# Patient Record
Sex: FEMALE | Race: WHITE | NOT HISPANIC OR LATINO | Employment: FULL TIME
[De-identification: names, ages, dates, MRNs, and addresses within clinical notes are randomized per-mention and may not be internally consistent; named-entity substitution may affect disease eponyms.]

---

## 2017-10-02 ENCOUNTER — RECORDS - HEALTHEAST (OUTPATIENT)
Dept: ADMINISTRATIVE | Facility: OTHER | Age: 32
End: 2017-10-02

## 2017-12-12 ENCOUNTER — RECORDS - HEALTHEAST (OUTPATIENT)
Dept: ADMINISTRATIVE | Facility: OTHER | Age: 32
End: 2017-12-12

## 2018-04-20 ENCOUNTER — RECORDS - HEALTHEAST (OUTPATIENT)
Dept: ADMINISTRATIVE | Facility: OTHER | Age: 33
End: 2018-04-20

## 2018-04-27 ENCOUNTER — RECORDS - HEALTHEAST (OUTPATIENT)
Dept: ADMINISTRATIVE | Facility: OTHER | Age: 33
End: 2018-04-27

## 2020-01-15 ENCOUNTER — OFFICE VISIT - HEALTHEAST (OUTPATIENT)
Dept: FAMILY MEDICINE | Facility: CLINIC | Age: 35
End: 2020-01-15

## 2020-01-15 DIAGNOSIS — N83.201 RIGHT OVARIAN CYST: ICD-10-CM

## 2020-01-15 DIAGNOSIS — R10.30 LOWER ABDOMINAL PAIN: ICD-10-CM

## 2020-01-15 LAB
ALBUMIN SERPL-MCNC: 4.2 G/DL (ref 3.5–5)
ALP SERPL-CCNC: 48 U/L (ref 45–120)
ALT SERPL W P-5'-P-CCNC: 12 U/L (ref 0–45)
ANION GAP SERPL CALCULATED.3IONS-SCNC: 8 MMOL/L (ref 5–18)
AST SERPL W P-5'-P-CCNC: 15 U/L (ref 0–40)
BILIRUB SERPL-MCNC: 0.3 MG/DL (ref 0–1)
BUN SERPL-MCNC: 14 MG/DL (ref 8–22)
CALCIUM SERPL-MCNC: 9.6 MG/DL (ref 8.5–10.5)
CHLORIDE BLD-SCNC: 104 MMOL/L (ref 98–107)
CO2 SERPL-SCNC: 27 MMOL/L (ref 22–31)
CREAT SERPL-MCNC: 0.66 MG/DL (ref 0.6–1.1)
GFR SERPL CREATININE-BSD FRML MDRD: >60 ML/MIN/1.73M2
GLUCOSE BLD-MCNC: 96 MG/DL (ref 70–125)
HCG UR QL: NEGATIVE
LIPASE SERPL-CCNC: 14 U/L (ref 0–52)
POTASSIUM BLD-SCNC: 4.4 MMOL/L (ref 3.5–5)
PROT SERPL-MCNC: 7.5 G/DL (ref 6–8)
SODIUM SERPL-SCNC: 139 MMOL/L (ref 136–145)

## 2020-08-21 ENCOUNTER — AMBULATORY - HEALTHEAST (OUTPATIENT)
Dept: LAB | Facility: CLINIC | Age: 35
End: 2020-08-21

## 2020-08-21 ENCOUNTER — COMMUNICATION - HEALTHEAST (OUTPATIENT)
Dept: TELEHEALTH | Facility: CLINIC | Age: 35
End: 2020-08-21

## 2020-08-21 ENCOUNTER — OFFICE VISIT - HEALTHEAST (OUTPATIENT)
Dept: FAMILY MEDICINE | Facility: CLINIC | Age: 35
End: 2020-08-21

## 2020-08-21 ENCOUNTER — COMMUNICATION - HEALTHEAST (OUTPATIENT)
Dept: FAMILY MEDICINE | Facility: CLINIC | Age: 35
End: 2020-08-21

## 2020-08-21 DIAGNOSIS — M54.50 BILATERAL LOW BACK PAIN WITHOUT SCIATICA, UNSPECIFIED CHRONICITY: ICD-10-CM

## 2020-08-21 DIAGNOSIS — D50.8 OTHER IRON DEFICIENCY ANEMIA: ICD-10-CM

## 2020-08-21 DIAGNOSIS — Z00.00 WELL FEMALE EXAM WITHOUT GYNECOLOGICAL EXAM: ICD-10-CM

## 2020-08-21 LAB
ERYTHROCYTE [DISTWIDTH] IN BLOOD BY AUTOMATED COUNT: 11.1 % (ref 11–14.5)
HCT VFR BLD AUTO: 34.5 % (ref 35–47)
HGB BLD-MCNC: 11.8 G/DL (ref 12–16)
MCH RBC QN AUTO: 30 PG (ref 27–34)
MCHC RBC AUTO-ENTMCNC: 34.1 G/DL (ref 32–36)
MCV RBC AUTO: 88 FL (ref 80–100)
PLATELET # BLD AUTO: 150 THOU/UL (ref 140–440)
PMV BLD AUTO: 10.4 FL (ref 7–10)
RBC # BLD AUTO: 3.92 MILL/UL (ref 3.8–5.4)
WBC: 7.3 THOU/UL (ref 4–11)

## 2020-08-21 RX ORDER — FERROUS SULFATE 325(65) MG
1 TABLET ORAL
Qty: 30 TABLET | Refills: 3 | Status: SHIPPED | OUTPATIENT
Start: 2020-08-21 | End: 2021-08-11

## 2020-08-21 ASSESSMENT — MIFFLIN-ST. JEOR: SCORE: 1449.32

## 2021-03-15 ENCOUNTER — OFFICE VISIT - HEALTHEAST (OUTPATIENT)
Dept: FAMILY MEDICINE | Facility: CLINIC | Age: 36
End: 2021-03-15

## 2021-03-15 DIAGNOSIS — M79.604 BILATERAL LEG PAIN: ICD-10-CM

## 2021-03-15 DIAGNOSIS — Z33.1 PREGNANT STATE, INCIDENTAL: ICD-10-CM

## 2021-03-15 DIAGNOSIS — M79.605 BILATERAL LEG PAIN: ICD-10-CM

## 2021-03-31 ENCOUNTER — AMBULATORY - HEALTHEAST (OUTPATIENT)
Dept: OBGYN | Facility: HOSPITAL | Age: 36
End: 2021-03-31

## 2021-03-31 DIAGNOSIS — Z11.59 ENCOUNTER FOR SCREENING FOR OTHER VIRAL DISEASES: ICD-10-CM

## 2021-04-05 ENCOUNTER — SURGERY - HEALTHEAST (OUTPATIENT)
Dept: OBGYN | Facility: HOSPITAL | Age: 36
End: 2021-04-05

## 2021-04-05 ENCOUNTER — ANESTHESIA - HEALTHEAST (OUTPATIENT)
Dept: OBGYN | Facility: HOSPITAL | Age: 36
End: 2021-04-05

## 2021-04-05 ASSESSMENT — MIFFLIN-ST. JEOR: SCORE: 1608.07

## 2021-06-04 VITALS
HEART RATE: 86 BPM | DIASTOLIC BLOOD PRESSURE: 73 MMHG | TEMPERATURE: 98.2 F | OXYGEN SATURATION: 97 % | WEIGHT: 176.3 LBS | BODY MASS INDEX: 29.34 KG/M2 | SYSTOLIC BLOOD PRESSURE: 108 MMHG

## 2021-06-04 VITALS
SYSTOLIC BLOOD PRESSURE: 118 MMHG | RESPIRATION RATE: 18 BRPM | TEMPERATURE: 98.1 F | DIASTOLIC BLOOD PRESSURE: 64 MMHG | BODY MASS INDEX: 27.49 KG/M2 | WEIGHT: 165 LBS | HEIGHT: 65 IN | HEART RATE: 84 BPM | OXYGEN SATURATION: 99 %

## 2021-06-05 VITALS
RESPIRATION RATE: 14 BRPM | WEIGHT: 198.3 LBS | DIASTOLIC BLOOD PRESSURE: 72 MMHG | HEART RATE: 78 BPM | BODY MASS INDEX: 33 KG/M2 | OXYGEN SATURATION: 98 % | TEMPERATURE: 98.3 F | SYSTOLIC BLOOD PRESSURE: 106 MMHG

## 2021-06-05 VITALS — BODY MASS INDEX: 33.32 KG/M2 | WEIGHT: 200 LBS | HEIGHT: 65 IN

## 2021-06-10 NOTE — PROGRESS NOTES
Continue once daily iron and vitamin C for anemia. please call results to the patient or send a letter if not reachable by phone.

## 2021-06-15 NOTE — PATIENT INSTRUCTIONS - HE
What causes varicose veins in pregnancy:    an increase in blood volume    a decrease in the rate at which blood moves from legs up to the pelvis    surging hormones     What can help with varicose veins during pregnancy:    Change position regularly. Stand up if you ve been seated, sit down if you ve been standing.    Avoid wearing high heels. Flats will engage your calf muscles, promoting better circulation. Occationally do calf raises while holding onto something for support.     Don t cross your legs when you re seated. This can impede circulation.    Recline with your legs elevated above the level of your heart to improve circulation.    Minimize salt in your diet, it can worsen swelling.    Drink plenty of water.    Sleep on your left side to relieve pressure on the large vein that carries blood from the lower to upper part of your body.    Wear compression stockings to help stimulate blood flow.

## 2021-06-15 NOTE — PROGRESS NOTES
Chief Complaint   Patient presents with     Other     bilateral pain in leg veins x4 days          Clinical Decision Making: Patient is in late pregnancy and has visible varicose veins. Given her bilateral posterior leg pain; venous US was completed for DVT r/o. US were negative. Recommend supportive cares for varicose veins in pregnancy.     1. Bilateral leg pain  US Venous Legs Bilateral         Patient Instructions    What causes varicose veins in pregnancy:    an increase in blood volume    a decrease in the rate at which blood moves from legs up to the pelvis    surging hormones     What can help with varicose veins during pregnancy:    Change position regularly. Stand up if you ve been seated, sit down if you ve been standing.    Avoid wearing high heels. Flats will engage your calf muscles, promoting better circulation. Occationally do calf raises while holding onto something for support.     Don t cross your legs when you re seated. This can impede circulation.    Recline with your legs elevated above the level of your heart to improve circulation.    Minimize salt in your diet, it can worsen swelling.    Drink plenty of water.    Sleep on your left side to relieve pressure on the large vein that carries blood from the lower to upper part of your body.    Wear compression stockings to help stimulate blood flow.             HPI:  Day Quevedo is a 35 y.o. female who is currently 36.5 weeks pregnant who presents today complaining of bilateral leg pain x 4 days. She has been wearing compression stockings, but they are still very painful. She denies any redness, but she has had visible varicose veins. She reports that the pain in her legs is sharp spanning from the back of her calves up to the mid thigh. She also has pain and swelling of the veins around her vagina and groin.     History obtained from the patient.    Problem List:  2018-05: Pregnant      Past Medical History:   Diagnosis Date     Uterine  prolapse        Social History     Tobacco Use     Smoking status: Never Smoker     Smokeless tobacco: Never Used   Substance Use Topics     Alcohol use: No       Review of Systems   Cardiovascular: Negative for leg swelling.   Skin: Positive for color change (blue veins). Negative for rash and wound.   All other systems reviewed and are negative.      Vitals:    03/15/21 1445   BP: 106/72   Patient Site: Right Arm   Patient Position: Sitting   Cuff Size: Adult Regular   Pulse: 78   Resp: 14   Temp: 98.3  F (36.8  C)   TempSrc: Oral   SpO2: 98%   Weight: 198 lb 4.8 oz (89.9 kg)       Physical Exam  Vitals signs and nursing note reviewed.   Constitutional:       General: She is not in acute distress.     Appearance: She is well-developed. She is not diaphoretic.   HENT:      Head: Normocephalic and atraumatic.      Right Ear: External ear normal.      Left Ear: External ear normal.   Eyes:      General:         Right eye: No discharge.         Left eye: No discharge.      Conjunctiva/sclera: Conjunctivae normal.   Pulmonary:      Effort: Pulmonary effort is normal. No respiratory distress.   Musculoskeletal:      Right lower leg: No edema.      Left lower leg: No edema.   Skin:     Findings: No erythema or rash.      Comments: Visible verucose veins present on the posterior aspect of both legs   Neurological:      Mental Status: She is alert.   Psychiatric:         Behavior: Behavior normal.         Thought Content: Thought content normal.         Judgment: Judgment normal.       Radiology:    Us Venous Legs Bilateral    Result Date: 3/15/2021  EXAM DATE:         03/15/2021 EXAM: US LOWER EXTREMITY VEINS COMPLETE BILATERAL LOCATION: [facility] DATE/TIME: 3/15/2021 3:15 PM INDICATION: Bilateral leg pain. 36.5 weeks pregnant. R/o DVT COMPARISON: 12/16/2015. TECHNIQUE: Venous Duplex ultrasound of bilateral lower extremities with and without compression, augmentation and duplex. Color flow and spectral  Doppler with waveform analysis performed. FINDINGS: Exam includes the common femoral, femoral, popliteal veins as well as segmentally visualized deep calf veins and greater saphenous vein. RIGHT: No deep vein thrombosis. No superficial thrombophlebitis. No popliteal cyst. LEFT: No deep vein thrombosis. No superficial thrombophlebitis. No popliteal cyst. IMPRESSION: 1.  No deep venous thrombosis in the bilateral lower extremities.       At the end of the encounter, I discussed results, diagnosis, medications. Discussed red flags for immediate return to clinic/ER, as well as indications for follow up if no improvement. Patient understood and agreed to plan. Patient was stable for discharge.

## 2021-06-16 PROBLEM — Z98.891 H/O CESAREAN SECTION: Status: ACTIVE | Noted: 2021-04-05

## 2021-06-16 NOTE — ANESTHESIA POSTPROCEDURE EVALUATION
Patient: Day Quevedo  Procedure(s):  PRIMARY  SECTION  Anesthesia type: spinal    Patient location: PACU  Last vitals:   Vitals Value Taken Time   /59 21   Temp 36.9  C (98.4  F) 21   Pulse 66 21   Resp 16 21   SpO2 97 % 21   Vitals shown include unvalidated device data.  Post vital signs: stable  Level of consciousness: awake and responds to simple questions  Post-anesthesia pain: pain controlled  Post-anesthesia nausea and vomiting: no  Pulmonary: unassisted, return to baseline  Cardiovascular: stable and blood pressure at baseline  Hydration: adequate  Anesthetic events: no    QCDR Measures:  ASA# 11 - June-op Cardiac Arrest: ASA11B - Patient did NOT experience unanticipated cardiac arrest  ASA# 12 - June-op Mortality Rate: ASA12B - Patient did NOT die  ASA# 13 - PACU Re-Intubation Rate: ASA13B - Patient did NOT require a new airway mgmt  ASA# 10 - Composite Anes Safety: ASA10A - No serious adverse event    Additional Notes:

## 2021-06-16 NOTE — ANESTHESIA PROCEDURE NOTES
Spinal Block    Patient location during procedure: OR  Start time: 4/5/2021 1:33 PM  End time: 4/5/2021 1:35 PM  Reason for block: at surgeon's request and primary anesthetic    Staffing:  Performing  Anesthesiologist: Blanca Brown MD    Preanesthetic Checklist  Completed: patient identified, risks, benefits, and alternatives discussed, timeout performed, consent obtained, airway assessed, oxygen available, suction available, emergency drugs available and hand hygiene performed  Spinal Block  Patient position: sitting  Prep: ChloraPrep  Patient monitoring: heart rate, cardiac monitor, continuous pulse ox and blood pressure  Approach: midline  Location: L3-4  Injection technique: single-shot  Needle type: pencil-tip   Needle gauge: 24 G    Assessment  Sensory level: T4

## 2021-06-16 NOTE — ANESTHESIA CARE TRANSFER NOTE
Last vitals:   Vitals:    04/05/21 1503   BP: 113/59   Pulse: (!) 58   Resp: 18   Temp:    SpO2: 99%     Patient's level of consciousness is drowsy  Spontaneous respirations: yes  Maintains airway independently: yes  Dentition unchanged: yes  Oropharynx: oropharynx clear of all foreign objects    QCDR Measures:  ASA# 20 - Surgical Safety Checklist: WHO surgical safety checklist completed prior to induction    PQRS# 430 - Adult PONV Prevention: 4558F - Pt received => 2 anti-emetic agents (different classes) preop & intraop  ASA# 8 - Peds PONV Prevention: NA - Not pediatric patient, not GA or 2 or more risk factors NOT present  PQRS# 424 - June-op Temp Management: 4559F - At least one body temp DOCUMENTED => 35.5C or 95.9F within required timeframe  PQRS# 426 - PACU Transfer Protocol: - Transfer of care checklist used  ASA# 14 - Acute Post-op Pain: ASA14B - Patient did NOT experience pain >= 7 out of 10

## 2021-06-16 NOTE — ANESTHESIA PREPROCEDURE EVALUATION
Anesthesia Evaluation      No history of anesthetic complications     Airway   Mallampati: II   Pulmonary     breath sounds clear to auscultation  (-) asthma, not a smoker                         Cardiovascular   Exercise tolerance: > or = 4 METS  (-) hypertension  Rhythm: regular        Neuro/Psych - negative ROS     Endo/Other    (+) pregnant ( s/f primary LTCS for breech presentation)     GI/Hepatic/Renal - negative ROS           Dental                         Anesthesia Plan  Planned anesthetic: spinal  Elective CS ERAS Protocol  SAB - Duramorph 150mcg, Fentanyl 15mcg  Lower body Cathy Hugger  Toradol 15mg at case closure if cleared with surgeon  Zofran 4mg pre-SAB  TAP per protocol/OB request     ASA 2     Anesthetic plan and risks discussed with: patient and spouse  Anesthesia plan special considerations: antiemetics,   Post-op plan: routine recovery

## 2021-06-16 NOTE — ANESTHESIA PROCEDURE NOTES
Peripheral Block    Patient location during procedure: post-op  Start time: 4/5/2021 2:30 PM  End time: 4/5/2021 2:50 PM  post-op analgesia per surgeon order as noted in medical record  Staffing:  Performing  Anesthesiologist: Jovany Varela MD  Preanesthetic Checklist  Completed: patient identified, site marked, risks, benefits, and alternatives discussed, timeout performed, consent obtained, airway assessed, oxygen available, suction available, emergency drugs available and hand hygiene performed  Peripheral Block  Block type: other, TAP  Prep: ChloraPrep  Patient position: supine  Patient monitoring: cardiac monitor, continuous pulse oximetry, heart rate and blood pressure  Laterality: bilateral  Injection technique: ultrasound guided    Ultrasound used to visualize needle placement in proximity to nerve being blocked: yes   US used to visualize anesthetic spread  Visualized anatomic structures normal  No Pathological Findings  Permanent ultrasound image captured for medical record  Sterile gel and probe cover used for ultrasound.  Needle  Needle type: Stimuplex   Needle gauge: 20G  Needle length: 6 in    Assessment  Injection assessment: no difficulty with injection, negative aspiration for heme, no paresthesia on injection and incremental injection

## 2021-06-17 NOTE — PATIENT INSTRUCTIONS - HE
Patient Instructions by Lucero Kc PA-C at 1/15/2020 10:00 AM     Author: Lucero Kc PA-C Service: -- Author Type: Physician Assistant    Filed: 1/15/2020  3:01 PM Encounter Date: 1/15/2020 Status: Signed    : Lucero Kc PA-C (Physician Assistant)       Patient Education     Ovarian Cysts    The ovaries are two small organs located on each side of a womans uterus (womb). They are part of the female reproductive system. Ovarian cysts are sacs filled with fluid or tissue that form on or inside the ovaries.  Ovarian cysts are common in women, especially during childbearing years. There are different types of cysts. Most are harmless (benign) and go away on their own. They often cause no symptoms. If symptoms do occur, they can include mild pain or pressure in the lower belly (abdomen).  Cysts that are large or break (rupture) may cause more severe pain and symptoms. In these cases, you may need hospital care or treatment such as surgery. You may need more extensive treatment if a cyst causes an ovary to twist (called torsion) or if your doctor suspects your cyst is cancerous. Keep in mind that most cysts are not cancerous, however.  General care    To help relieve pain, your healthcare provider may recommend using over-the-counter pain medicine. If needed, your provide may prescribe stronger pain medicine.    Depending on the type of cyst you have, your healthcare provider may advise taking birth control pills. These help shrink cysts in certain cases. They may also help prevent new cysts from forming. Be sure to take these medicines as directed if they are prescribed.    Your healthcare provider may advise you to watch your symptoms over time to see if they go away or worsen. Regular ultrasound tests may also be advised. These can help check if a cyst goes away or grows in size.  Follow-up care  Follow up with your healthcare provider, or as advised.  When to seek  medical advice  Call your healthcare provider right away if any of these occur:    Pain worsens or fails to get better with home treatment    Fever of 100.4 F (38 C) or higher (or other fever amount directed by your healthcare provider)    Nausea and vomiting    Weakness, dizziness, or fainting    Abnormal vaginal bleeding  Date Last Reviewed: 11/1/2017 2000-2017 [employer]. [address] [place] [address]. All rights reserved. This information is not intended as a substitute for professional medical care. Always follow your healthcare professional's instructions.

## 2021-06-20 NOTE — LETTER
Letter by Grace Caruso PA-C at      Author: Grace Caruso PA-C Service: -- Author Type: --    Filed:  Encounter Date: 8/21/2020 Status: (Other)         Day Quevedo  [address] [facility] [id number]             August 21, 2020         Dear Ms. Quevedo,    Below are the results from your recent visit:    Resulted Orders   HM2(CBC w/o Differential)   Result Value Ref Range    WBC 7.3 4.0 - 11.0 thou/uL    RBC 3.92 3.80 - 5.40 mill/uL    Hemoglobin 11.8 (L) 12.0 - 16.0 g/dL    Hematocrit 34.5 (L) 35.0 - 47.0 %    MCV 88 80 - 100 fL    MCH 30.0 27.0 - 34.0 pg    MCHC 34.1 32.0 - 36.0 g/dL    RDW 11.1 11.0 - 14.5 %    Platelets 150 140 - 440 thou/uL    MPV 10.4 (H) 7.0 - 10.0 fL        Continue once daily iron and vitamin C for anemia    Please call with questions or contact us using [email or web address].    Sincerely,        Electronically signed by Grace Caruso PA-C

## 2021-06-27 ENCOUNTER — HEALTH MAINTENANCE LETTER (OUTPATIENT)
Age: 36
End: 2021-06-27

## 2021-06-28 NOTE — PROGRESS NOTES
Progress Notes by Lucero Kc PA-C at 1/15/2020 10:00 AM     Author: Lucero Kc PA-C Service: -- Author Type: Physician Assistant    Filed: 1/21/2020  7:30 AM Encounter Date: 1/15/2020 Status: Signed    : Lucero Kc PA-C (Physician Assistant)         Chief Complaint   Patient presents with   ? Abdominal Pain     x 5 days  states seen this morning at [facility].       HPI:  Day Quevedo is a 34 y.o. female who complains of moderate RLQ abd pain onset 5 days ago. She states pain has been worse since last night. She does feel she is straining to have BMs occasionally but is having a BM daily. Symptoms are constant in duration. Denies urinary sx, vaginal discharge, melena, hematochezia, fever/chills, CP, SOB, N/V/D, rash, or any other symptoms. Patient denies hx of abdominal surgery. LMP was 12/30 and pt reports about 1 week after menses ended she had vaginal bleeding for about 5  days. This resolved a couple days ago. She is sexually active.    She does have a hx of uterine prolapse after giving birth about 1.5 years ago. She has an appt with OB/GYN in a few weeks.    Pt as seen at [facility] a few hours ago. UA was negative. CBC was negative. CMP and CT abd/pelvis were also ordered. Patient states she was unable to get the CT scan right away, which she did not like, so she came here instead.    Problem List:  2018-05: Pregnant      Past Medical History:   Diagnosis Date   ? Uterine prolapse        Social History     Tobacco Use   ? Smoking status: Never Smoker   ? Smokeless tobacco: Never Used   Substance Use Topics   ? Alcohol use: No     No surgical history.    Review of Systems   Constitutional: Negative for chills and fever.   Respiratory: Negative for shortness of breath.    Cardiovascular: Negative for chest pain.   Gastrointestinal: Positive for abdominal pain. Negative for blood in stool, diarrhea, nausea and vomiting.   Genitourinary: Positive for vaginal bleeding.  Negative for dysuria and frequency.   Skin: Negative for rash.   All other systems reviewed and are negative.      Vitals:    01/15/20 1014   BP: 108/73   Patient Site: Right Arm   Patient Position: Sitting   Cuff Size: Adult Regular   Pulse: 86   Temp: 98.2  F (36.8  C)   TempSrc: Oral   SpO2: 97%   Weight: 176 lb 4.8 oz (80 kg)       Physical Exam   Constitutional: She appears well-developed and well-nourished.  Non-toxic appearance.   HENT:   Head: Normocephalic and atraumatic.   Nose: Nose normal.   Eyes: Lids are normal.   Neck: Normal range of motion.   Cardiovascular: Normal rate, regular rhythm and normal heart sounds.   Pulmonary/Chest: Effort normal and breath sounds normal.   Abdominal: Normal appearance. There is abdominal tenderness in the right lower quadrant. There is no rigidity and no guarding.       Lymphadenopathy:     She has no cervical adenopathy.   Neurological: She is alert.   Skin: Skin is warm and dry.   Psychiatric: She has a normal mood and affect.       Labs:   Ref Range & Units 1/15/20 1106     Sodium 136 - 145 mmol/L 139     Potassium 3.5 - 5.0 mmol/L 4.4     Chloride 98 - 107 mmol/L 104     CO2 22 - 31 mmol/L 27     Anion Gap, Calculation 5 - 18 mmol/L 8     Glucose 70 - 125 mg/dL 96     BUN 8 - 22 mg/dL 14     Creatinine 0.60 - 1.10 mg/dL 0.66     GFR MDRD Af Amer >60 mL/min/1.73m2 >60     GFR MDRD Non Af Amer >60 mL/min/1.73m2 >60     Bilirubin, Total 0.0 - 1.0 mg/dL 0.3     Calcium 8.5 - 10.5 mg/dL 9.6     Protein, Total 6.0 - 8.0 g/dL 7.5     Albumin 3.5 - 5.0 g/dL 4.2     Alkaline Phosphatase 45 - 120 U/L 48     AST 0 - 40 U/L 15     ALT 0 - 45 U/L 12       Lipase 0 - 52 U/L 14      Pregnancy Test, Urine Negative Negative          Radiology:  Us Pelvis With Transvaginal Non Ob    Result Date: 1/15/2020  EXAM DATE:         01/15/2020 EXAM: US FEMALE PELVIS COMPLETE NON-OB, US TRANSVAGINAL NON-OB LOCATION: [facility] DATE/TIME: 1/15/2020 11:30 AM INDICATION: lower  abd pain x 5 days, worse since last night. hx uterine prolapse COMPARISON: None. TECHNIQUE: Transabdominal scans were performed. Endovaginal ultrasound was performed to better visualize the adnexa. FINDINGS: UTERUS: 11 x 5.2 x 6.9 cm. Normal in size and position with no masses. ENDOMETRIUM: 14 mm. Mildly inhomogeneous endometrium without discrete mass. RIGHT OVARY: 3.7 x 3.1 x 1.8 cm. There is a probable small hemorrhagic cyst measuring 2.1 x 1.2 x 1.1 cm. No evidence for ovarian torsion. LEFT OVARY: 3.4 x 2.9 x 2.4 cm. Normal with flow demonstrated. No significant free fluid. IMPRESSION: 1.  Probable small hemorrhagic cyst right ovary measuring 2.1 x 1.2 x 1.1 cm. No evidence for right ovarian torsion. 2.  Normal sonographic appearance of the left ovary. 3.  Endometrial stripe upper normal in thickness. Management Recommendation: Hemorrhagic Cyst Postmenopausal Patient Any Size - follow up US. Reproductive Age Patient <5 cm: No follow up. >5 cm: Follow up US in 6-12 weeks. Reference: Asymptomatic Ovarian and Other Adnexal Cysts Imaged at US. Radiology: Volume 256: Number 3-September 2010     Ct Abdomen Pelvis With Oral Without Iv Contrast    Result Date: 1/15/2020  EXAM DATE:         01/15/2020 EXAM: CT ABDOMEN, PELVIS WITH CONTRAST LOCATION: [facility] DATE/TIME: 1/15/2020 11:45 AM INDICATION: Lower abd pain x 5 days, worse since last night. Hx uterine prolapse. COMPARISON: None. TECHNIQUE: CT scan of the abdomen and pelvis was performed following injection of IV contrast. Multiplanar reformats were obtained. Dose reduction techniques were used. CONTRAST: 100 mL IV Isovue 370 administered. FINDINGS: LOWER CHEST: Normal. HEPATOBILIARY: There is a 6 mm low dense lesion involving segment 7 of the right hepatic lobe on image 39 of series 3. This is too small to characterize and likely a hemangioma. PANCREAS: Normal. SPLEEN: Normal. ADRENAL GLANDS: Normal. KIDNEYS/BLADDER: Normal. BOWEL: Normal. LYMPH  NODES: Normal. VASCULATURE: Unremarkable. PELVIC ORGANS: Normal. OTHER: None. MUSCULOSKELETAL: Normal. IMPRESSION: 1.  No bowel obstruction or intra-abdominal inflammation identified to explain patient's symptoms. DICOM format image data is available to non-affiliated external healthcare facilities or entities on a secure, media-free, reciprocally searchable basis with patient authorization for at least a 12-month period after the study.       Clinical Decision Making:    CBC & UA done at [facility] and unremarkable. Urine preg test negative. CMP & lipase unremarkable. CT abd/pelvis negative. Pelvic ultrasound indicates small probable hemorrhagic cyst on R ovary. This may be cause of patient's symptoms- advised ibuprofen & heat PRN. F/u with OB/GYN in a few weeks as planned. If pain worsens, go to ER.    >30 minutes of the 60 minute visit was spent counseling the patient on her diagnosis and treatment plan and coordination of her care and communication with other care providers.   At the end of the encounter, I discussed results, diagnosis, medications. Discussed red flags for immediate return to clinic/ER, as well as indications for follow up if no improvement. Patient understood and agreed to plan. Patient was stable for discharge.    1. Right ovarian cyst     2. Lower abdominal pain  Pregnancy, Urine    US Pelvis With Transvaginal Non OB    CT Abdomen Pelvis With Oral Without IV Contrast    Comprehensive Metabolic Panel    Lipase         Return in about 2 weeks (around 1/29/2020) for follow up with OB/GYN.    RONALDO Walden, PA-C  [facility]

## 2021-06-29 NOTE — PROGRESS NOTES
Progress Notes by Grace Caruso PA-C at 8/21/2020  9:15 AM     Author: Grace Caruso PA-C Service: -- Author Type: Physician Assistant    Filed: 8/26/2020  8:30 AM Encounter Date: 8/21/2020 Status: Signed    : Grace Caruso PA-C (Physician Assistant)       FEMALE PREVENTATIVE EXAM    Assessment and Plan:     1. Well female exam without gynecological exam     2. Bilateral low back pain without sciatica, unspecified chronicity  Ambulatory referral to Adult PT- External   3. Other iron deficiency anemia  ferrous sulfate 325 (65 FE) MG tablet    ascorbic acid, vitamin C, (VITAMIN C) 250 MG tablet    HM2(CBC w/o Differential)         Next follow up:  No follow-ups on file.    Immunization Review  Adult Imm Review: No immunizations due today  non-smoker    I discussed the following with the patient:   Adult Healthy Living: Importance of regular exercise  Healthy nutrition  Supplement use        Subjective:   Chief Complaint: Day Quevedo is an 35 y.o. female here for a preventative health visit. She needs to establish with a primary care. She is 7 weeks pregnant and does have an OB. She is doing well and is taking her prenatal vitamins. She has some morning nausea, but no vomiting. She has a history of low iron and usually takes additional iron once daily during pregnancy. She has some chronic lumbar back pain but no pelvic pain, vaginal bleeding or dysuria.  ROS: Patient denies fever, chills, sweats, fainting, fatigue, weight change, dizziness, sleep problems, chest pain, palpitations, shortness of breath, wheezing, cough,  sore throat, changes in hearing, ear pain,tinnitus,  disphagia, sore throat, globus, changes in vision, eye pain eye redness, acid reflux, nausea, vomiting, diarrhea, constipation, black or bloody stools,  Dysuria, frequency, urinary incontinence, nocturia, hematuria, back pain,joint pain, bone pain, muscle cramps,edema, weakness, numbness, tingling of extremities,  rash, itching, skin changes, swollen lymph nodes, thirst, increased urination, breast lumps, breast pain, nipple discharge, memory difficulties, anxiety, mood swings, (female)vaginal discharge, dyspareunia, menorrhagia, pelvic pain, sexual dysfunction,   (male) testicular lumps, erectile dysfunction.           Healthy Habits  Are you taking a daily aspirin? No  Do you typically exercising at least 40 min, 3-4 times per week?  Yes  Do you usually eat at least 4 servings of fruit and vegetables a day, include whole grains and fiber and avoid regularly eating high fat foods? Yes  Have you had an eye exam in the past two years? Yes  Do you see a dentist twice per year? Yes  Do you have any concerns regarding sleep? No    Safety Screen  If you own firearms, are they secured in a locked gun cabinet or with trigger locks? NO  Do you feel you are safe where you are living?: Yes (8/21/2020  9:46 AM)  Do you feel you are safe in your relationship(s)?: Yes (8/21/2020  9:46 AM)      Review of Systems:  Please see above.  The rest of the review of systems are negative for all systems.     Pap History:   No - age 30-65 PAP every 3 years recommended  Cancer Screening       Status Date      PAP SMEAR Overdue 5/12/2008               History     Not marked as reviewed during this visit.            Objective:   Vital Signs: There were no vitals taken for this visit.       PHYSICAL EXAM  Alert, cooperative, well-hydrated.  Appears well.  Eyes: Pupils equal, round, reactive to light.  HEENT: Sclera white, nares patent, MMM   Lungs: Clear to auscultation. No retractions, no increased work of respiration, equal chest rise.   Heart: Regular rate and rhythm, no murmurs, clicks,    Gallops.  Abdomen: Soft, bowel sounds in 4 quadrants with no tenderness to palpation, no organomegaly or masses, no aortic or renal bruits.  Extremities: no tenderness to palpation of gastrocnemius, bilaterally.  Skin: no increased warmth, edema, or erythema of  lower legs bilaterally.  Back:  No cervical, thoracic or lumbar tenderness to spinous processes or musculature.               Medication List          Accurate as of August 21, 2020 11:59 PM. If you have any questions, ask your nurse or doctor.            START taking these medications    ascorbic acid (vitamin C) 250 MG tablet  Also known as:  vitamin C  INSTRUCTIONS:  Take 1 tablet (250 mg total) by mouth daily.  Started by:  Grace Caruso PA-C           CHANGE how you take these medications    ferrous sulfate 325 (65 FE) MG tablet  INSTRUCTIONS:  Take 1 tablet (325 mg total) by mouth daily with breakfast.  What changed:  when to take this  Changed by:  Grace Caruso PA-C           CONTINUE taking these medications    prenatal vitamin iron-folic acid 27mg-0.8mg 27 mg iron- 800 mcg Tab tablet  Also known as:  PRENATAL S  INSTRUCTIONS:  Take 1 tablet by mouth daily.           STOP taking these medications    docusate sodium 100 MG capsule  Also known as:  COLACE  Stopped by:  Grace Caruso PA-C     ferrous fumarate-b12-vitamic C-folic acid 110-0.5 mg capsule  Also known as:  FOLTRIN  Stopped by:  Grace Caruso PA-C     ibuprofen 800 MG tablet  Also known as:  ADVIL,MOTRIN  Stopped by:  Grace Caruso PA-C           Where to Get Your Medications      These medications were sent to [id number] - [place], [place] - [address]  [address], [place] [address]    Phone:  [phone]     ascorbic acid (vitamin C) 250 MG tablet    ferrous sulfate 325 (65 FE) MG tablet         Additional Screenings Completed Today:

## 2021-07-14 PROBLEM — Z34.90 PREGNANT: Status: RESOLVED | Noted: 2018-05-13 | Resolved: 2020-01-15

## 2021-08-10 NOTE — PROGRESS NOTES
Assessment & Plan   Problem List Items Addressed This Visit     None      Visit Diagnoses     Dizziness    -  Primary    Relevant Orders    CBC with platelets (Completed)    Comprehensive metabolic panel (BMP + Alb, Alk Phos, ALT, AST, Total. Bili, TP) (Completed)    TSH with free T4 reflex    Folate    Vitamin B12    Abdominal pain, epigastric        Relevant Medications    pantoprazole (PROTONIX) 20 MG EC tablet    Other Relevant Orders    Helicobacter pylori Antigen Stool    Constipation, unspecified constipation type        Relevant Medications    psyllium (METAMUCIL SMOOTH TEXTURE) 28 % packet    SENNA-docusate sodium (SENNA S) 8.6-50 MG tablet    Balance problem             Patient symptoms of nausea, epigastric pain, dizziness and fatigue are somewhat vague and seem to be multifactorial in etiology.  I suspect that patient has GERD that is contributing to her sense of bloating, nausea and excessive belching as well as her emesis.  Also has underlying, untreated constipation that could be contributing to/worsening her underlying reflux.  Her dizziness may also be in part contributing to dizziness/fatigue. Has a history of RAHEL and last hemoglobin on file was 10 0.7 and is no longer on iron iron supplementation.  Orthostatics in the office negative.  Blood pressure low/normal range.  She did seem unsteady when she went from lying to sitting and then attempted to walk back to her chair after the exam.  Her TMs were intact and eye exam was unremarkable.  No focal neurologic deficits were identified.    Today, will start patient on PPI and get some baseline blood work to rule out alternate organic etiologies of her dizziness.  Will also test for H. pylori.  We will start her on a bowel regiment for her constipation.  Told her to increase hydration and drink at least 6 cups of water daily.    Addendum: Was told by nursing about an hour after patient left that she had a fall in the lobby after she had her blood  drawn.  Patient did not lose consciousness or hit her head.  Was given some food and water.  Vitals were stable and she was sent home.  If blood work comes back unremarkable and symptoms are persisting, may be worthwhile to think about doing an MRI as well.      54 minutes spent on the date of the encounter doing chart review, history and exam, documentation and further activities per the note     Return in about 1 month (around 2021) for GERD follow up , 30 mins .    Dilma Corona DO  [facility] RONEL Bernstein is a 36 year old who presents for the following health issues     HPI     Patient is a relatively healthy 36-year-old who is coming in to establish care and discuss onset of dizziness.    Is recently postpartum.  Had a  about 4 months ago for breech presentation.  Had no excessive blood loss and pregnancy itself was fine.  However, since the pregnancy, has been feeling extremely dizzy and weak.  She is also getting abdominal pain, in the epigastric region.  When she gets the abdominal pain, she feels nauseous and the only way to make her feel better is either by throwing up or going to the bathroom.  Also notes constipation since getting out of the hospital.  Also has been having excessive burping, gassiness and bloating.    She feels that the dizziness has become more persistent.  It comes and goes.  Not associated with any ringing or significant headaches.  At times, the dizziness is so bad that she will lose her balance.  S denies any changes in her visual acuity.  No chest pain, palpitations or lower extremity edema.      Has not tried any medications for it, is only on prenatal vitamins.    Does have a history of iron deficiency anemia but no longer taking iron supplementation.  Is breast-feeding and that is why she has not taken any medication because she is worried about safety with breast-feeding and certain medications.    Lives at home with her  "significant other and her   Sexually active  Goes to OB/GYN for her \"women's health\"-says she is UTD on Pap smear  No family history of cancers  Currently breast-feeding so she is amenorrheic  Is sexually active but no concerns for STIs    Does not smoke, drink or do any drugs      How many servings of fruits and vegetables do you eat daily?  2-3    On average, how many sweetened beverages do you drink each day (Examples: soda, juice, sweet tea, etc.  Do NOT count diet or artificially sweetened beverages)?   2-3 liter a day    How many days per week do you exercise enough to make your heart beat faster? 7    How many minutes a day do you exercise enough to make your heart beat faster? 30 - 60    How many days per week do you miss taking your medication? 0    Dizziness  Onset/Duration: after delivering her son 4 months ago  Description:   Do you feel faint: YES  Does it feel like the surroundings (bed, room) are moving: YES  Unsteady/off balance: YES  Have you passed out or fallen: no  Intensity: moderate  Progression of Symptoms: same and constant  Accompanying Signs & Symptoms:  Heart palpitations or chest pain: no  Nausea, vomiting: YES  Weakness or lack of coordination in arms or legs: no  Vision or speech changes: no  Numbness or tingling: no  Ringing in ears (Tinnitus): no  Hearing Loss: no  History:   Head trauma/concussion history: no  Previous similar symptoms: no  Recent bleeding history: no  Any new medications (BP?): no  Precipitating factors:   Worse with activity: no  Worse with head movement: no  Alleviating factors:   Does staying in a fixed position give relief: YES  Therapies tried and outcome: None    Review of Systems   As per hPI       Objective    /64 (BP Location: Right arm, Patient Position: Sitting)   Ht 1.676 m (5' 6\")   Wt 78.9 kg (174 lb)   BMI 28.08 kg/m    Body mass index is 28.08 kg/m .     Physical Exam   GENERAL: healthy, alert and no distress  EYES: Eyes grossly " normal to inspection, PERRL and conjunctivae and sclerae normal. No end gaze nystagmus.   HENT: ear canals and TM's normal, nose and mouth without ulcers or lesions  NECK: no adenopathy, no asymmetry, masses, or scars and thyroid normal to palpation  RESP: lungs clear to auscultation - no rales, rhonchi or wheezes  CV: regular rate and rhythm, normal S1 S2, no S3 or S4, no murmur, click or rub, no peripheral edema and peripheral pulses strong  ABDOMEN: soft, (+) epigastric tenderness, no hepatosplenomegaly, no masses and bowel sounds normal  MS: no gross musculoskeletal defects noted, no edema  SKIN: no suspicious lesions or rashes  NEURO: Normal strength and tone, mentation intact and speech normal  PSYCH: anxious

## 2021-08-11 ENCOUNTER — OFFICE VISIT (OUTPATIENT)
Dept: FAMILY MEDICINE | Facility: CLINIC | Age: 36
End: 2021-08-11
Payer: COMMERCIAL

## 2021-08-11 VITALS
OXYGEN SATURATION: 98 % | BODY MASS INDEX: 27.97 KG/M2 | DIASTOLIC BLOOD PRESSURE: 64 MMHG | HEIGHT: 66 IN | SYSTOLIC BLOOD PRESSURE: 100 MMHG | WEIGHT: 174 LBS

## 2021-08-11 DIAGNOSIS — R10.13 ABDOMINAL PAIN, EPIGASTRIC: ICD-10-CM

## 2021-08-11 DIAGNOSIS — R42 DIZZINESS: Primary | ICD-10-CM

## 2021-08-11 DIAGNOSIS — R26.89 BALANCE PROBLEM: ICD-10-CM

## 2021-08-11 DIAGNOSIS — K59.00 CONSTIPATION, UNSPECIFIED CONSTIPATION TYPE: ICD-10-CM

## 2021-08-11 PROBLEM — L92.0 GRANULOMA ANNULARE: Status: ACTIVE | Noted: 2020-06-23

## 2021-08-11 PROBLEM — R94.31 SHORTENED PR INTERVAL: Status: ACTIVE | Noted: 2018-02-21

## 2021-08-11 LAB
ALBUMIN SERPL-MCNC: 4 G/DL (ref 3.5–5)
ALP SERPL-CCNC: 69 U/L (ref 45–120)
ALT SERPL W P-5'-P-CCNC: 14 U/L (ref 0–45)
ANION GAP SERPL CALCULATED.3IONS-SCNC: 11 MMOL/L (ref 5–18)
AST SERPL W P-5'-P-CCNC: 19 U/L (ref 0–40)
BILIRUB SERPL-MCNC: 0.5 MG/DL (ref 0–1)
BUN SERPL-MCNC: 12 MG/DL (ref 8–22)
CALCIUM SERPL-MCNC: 9.3 MG/DL (ref 8.5–10.5)
CHLORIDE BLD-SCNC: 105 MMOL/L (ref 98–107)
CO2 SERPL-SCNC: 26 MMOL/L (ref 22–31)
CREAT SERPL-MCNC: 0.64 MG/DL (ref 0.6–1.1)
ERYTHROCYTE [DISTWIDTH] IN BLOOD BY AUTOMATED COUNT: 12.8 % (ref 10–15)
GFR SERPL CREATININE-BSD FRML MDRD: >90 ML/MIN/1.73M2
GLUCOSE BLD-MCNC: 90 MG/DL (ref 70–125)
HCT VFR BLD AUTO: 37.6 % (ref 35–47)
HGB BLD-MCNC: 12.1 G/DL (ref 11.7–15.7)
MCH RBC QN AUTO: 28.7 PG (ref 26.5–33)
MCHC RBC AUTO-ENTMCNC: 32.2 G/DL (ref 31.5–36.5)
MCV RBC AUTO: 89 FL (ref 78–100)
PLATELET # BLD AUTO: 175 10E3/UL (ref 150–450)
POTASSIUM BLD-SCNC: 4.6 MMOL/L (ref 3.5–5)
PROT SERPL-MCNC: 7 G/DL (ref 6–8)
RBC # BLD AUTO: 4.22 10E6/UL (ref 3.8–5.2)
SODIUM SERPL-SCNC: 142 MMOL/L (ref 136–145)
TSH SERPL DL<=0.005 MIU/L-ACNC: 1.57 UIU/ML (ref 0.3–5)
WBC # BLD AUTO: 5.5 10E3/UL (ref 4–11)

## 2021-08-11 PROCEDURE — 99215 OFFICE O/P EST HI 40 MIN: CPT | Performed by: STUDENT IN AN ORGANIZED HEALTH CARE EDUCATION/TRAINING PROGRAM

## 2021-08-11 PROCEDURE — 87338 HPYLORI STOOL AG IA: CPT | Performed by: STUDENT IN AN ORGANIZED HEALTH CARE EDUCATION/TRAINING PROGRAM

## 2021-08-11 PROCEDURE — 82746 ASSAY OF FOLIC ACID SERUM: CPT | Performed by: STUDENT IN AN ORGANIZED HEALTH CARE EDUCATION/TRAINING PROGRAM

## 2021-08-11 PROCEDURE — 36415 COLL VENOUS BLD VENIPUNCTURE: CPT | Performed by: STUDENT IN AN ORGANIZED HEALTH CARE EDUCATION/TRAINING PROGRAM

## 2021-08-11 PROCEDURE — 85027 COMPLETE CBC AUTOMATED: CPT | Performed by: STUDENT IN AN ORGANIZED HEALTH CARE EDUCATION/TRAINING PROGRAM

## 2021-08-11 PROCEDURE — 84443 ASSAY THYROID STIM HORMONE: CPT | Performed by: STUDENT IN AN ORGANIZED HEALTH CARE EDUCATION/TRAINING PROGRAM

## 2021-08-11 PROCEDURE — 80053 COMPREHEN METABOLIC PANEL: CPT | Performed by: STUDENT IN AN ORGANIZED HEALTH CARE EDUCATION/TRAINING PROGRAM

## 2021-08-11 PROCEDURE — 82607 VITAMIN B-12: CPT | Performed by: STUDENT IN AN ORGANIZED HEALTH CARE EDUCATION/TRAINING PROGRAM

## 2021-08-11 RX ORDER — PANTOPRAZOLE SODIUM 20 MG/1
20 TABLET, DELAYED RELEASE ORAL DAILY
Qty: 90 TABLET | Refills: 0 | Status: SHIPPED | OUTPATIENT
Start: 2021-08-11 | End: 2021-11-09

## 2021-08-11 RX ORDER — SENNA AND DOCUSATE SODIUM 50; 8.6 MG/1; MG/1
1 TABLET, FILM COATED ORAL AT BEDTIME
Qty: 90 TABLET | Refills: 0 | Status: SHIPPED | OUTPATIENT
Start: 2021-08-11 | End: 2021-11-09

## 2021-08-11 ASSESSMENT — MIFFLIN-ST. JEOR: SCORE: 1506.01

## 2021-08-11 NOTE — NURSING NOTE
Writer called to the front to assist.  Met patient and [facility].  Patient assisted into room 32 and seated.  She reports being lightheaded the past couple months.  She is also fasting today as she had labs.  In addition she is nursing.  She explains she left lab and got lightheaded and fell, landing on left hip and her hand.  Did not hit head, no loc.  She was given 2 apple juices and consumed both, total of 11 oz.      Skin warm and dry to touch, no diaphoresis noted.      /82, HR 72.    Discussed changing positions slowly and to be extra careful going from sitting to standing/ambulation as this is high risk time to fall given her recent condition.  Patient explained she was in a hurry to get home and feed her 4 month old.  Also discussed increasing fluid intake.  Finally if she experiences lightheaded feeling she should lay down, feet elevated and drink 2 glasses of water.  If these measures do not improve conditions after 1-2 hours she should call triage/clinic in future.  Patient agreed and verbalized understanding.  Writer walked her out and she was feeling much better.

## 2021-08-12 LAB
FOLATE SERPL-MCNC: 15.5 NG/ML
VIT B12 SERPL-MCNC: 403 PG/ML (ref 213–816)

## 2021-08-17 DIAGNOSIS — A04.8 H. PYLORI INFECTION: Primary | ICD-10-CM

## 2021-08-17 LAB — H PYLORI AG STL QL IA: POSITIVE

## 2021-08-17 RX ORDER — CLARITHROMYCIN 500 MG
500 TABLET ORAL 2 TIMES DAILY
Qty: 28 TABLET | Refills: 0 | Status: SHIPPED | OUTPATIENT
Start: 2021-08-17 | End: 2021-08-31

## 2021-08-17 RX ORDER — AMOXICILLIN 500 MG/1
1000 CAPSULE ORAL 2 TIMES DAILY
Qty: 28 CAPSULE | Refills: 0 | Status: SHIPPED | OUTPATIENT
Start: 2021-08-17 | End: 2023-04-07

## 2021-08-17 RX ORDER — AMOXICILLIN 500 MG/1
500 CAPSULE ORAL 2 TIMES DAILY
Qty: 28 CAPSULE | Refills: 0 | Status: SHIPPED | OUTPATIENT
Start: 2021-08-17 | End: 2021-08-17

## 2021-10-17 ENCOUNTER — HEALTH MAINTENANCE LETTER (OUTPATIENT)
Age: 36
End: 2021-10-17

## 2022-03-03 ENCOUNTER — OFFICE VISIT (OUTPATIENT)
Dept: FAMILY MEDICINE | Facility: CLINIC | Age: 37
End: 2022-03-03
Payer: COMMERCIAL

## 2022-03-03 VITALS
HEART RATE: 64 BPM | OXYGEN SATURATION: 97 % | SYSTOLIC BLOOD PRESSURE: 113 MMHG | BODY MASS INDEX: 26.5 KG/M2 | RESPIRATION RATE: 16 BRPM | DIASTOLIC BLOOD PRESSURE: 72 MMHG | TEMPERATURE: 98.2 F | WEIGHT: 164.2 LBS

## 2022-03-03 DIAGNOSIS — H81.13 BENIGN PAROXYSMAL POSITIONAL VERTIGO, BILATERAL: ICD-10-CM

## 2022-03-03 DIAGNOSIS — R42 DIZZINESS: Primary | ICD-10-CM

## 2022-03-03 LAB
BASOPHILS # BLD AUTO: 0.1 10E3/UL (ref 0–0.2)
BASOPHILS NFR BLD AUTO: 1 %
EOSINOPHIL # BLD AUTO: 0.1 10E3/UL (ref 0–0.7)
EOSINOPHIL NFR BLD AUTO: 1 %
ERYTHROCYTE [DISTWIDTH] IN BLOOD BY AUTOMATED COUNT: 12.8 % (ref 10–15)
HCT VFR BLD AUTO: 37.3 % (ref 35–47)
HGB BLD-MCNC: 11.9 G/DL (ref 11.7–15.7)
IMM GRANULOCYTES # BLD: 0 10E3/UL
IMM GRANULOCYTES NFR BLD: 0 %
LYMPHOCYTES # BLD AUTO: 2.9 10E3/UL (ref 0.8–5.3)
LYMPHOCYTES NFR BLD AUTO: 40 %
MCH RBC QN AUTO: 28.9 PG (ref 26.5–33)
MCHC RBC AUTO-ENTMCNC: 31.9 G/DL (ref 31.5–36.5)
MCV RBC AUTO: 91 FL (ref 78–100)
MONOCYTES # BLD AUTO: 0.3 10E3/UL (ref 0–1.3)
MONOCYTES NFR BLD AUTO: 5 %
NEUTROPHILS # BLD AUTO: 3.8 10E3/UL (ref 1.6–8.3)
NEUTROPHILS NFR BLD AUTO: 53 %
PLATELET # BLD AUTO: 184 10E3/UL (ref 150–450)
RBC # BLD AUTO: 4.12 10E6/UL (ref 3.8–5.2)
WBC # BLD AUTO: 7.3 10E3/UL (ref 4–11)

## 2022-03-03 PROCEDURE — 85025 COMPLETE CBC W/AUTO DIFF WBC: CPT | Performed by: NURSE PRACTITIONER

## 2022-03-03 PROCEDURE — 99214 OFFICE O/P EST MOD 30 MIN: CPT | Performed by: NURSE PRACTITIONER

## 2022-03-03 PROCEDURE — 36415 COLL VENOUS BLD VENIPUNCTURE: CPT | Performed by: NURSE PRACTITIONER

## 2022-03-03 RX ORDER — CLOTRIMAZOLE AND BETAMETHASONE DIPROPIONATE 10; .64 MG/G; MG/G
CREAM TOPICAL
COMMUNITY
Start: 2021-11-02 | End: 2023-04-07

## 2022-03-03 ASSESSMENT — ENCOUNTER SYMPTOMS
LIGHT-HEADEDNESS: 0
SPEECH DIFFICULTY: 0
PARESTHESIAS: 0
FEVER: 0
WEAKNESS: 0
DIZZINESS: 1
NUMBNESS: 0
CHILLS: 0
HEADACHES: 1

## 2022-03-03 NOTE — PATIENT INSTRUCTIONS
Breastfeeding Considerations   It is not known if meclizine is present in breast milk.  Drowsiness and irritability have been reported in  infants exposed to other antihistamines (Adam 1993). According to the , the decision to breastfeed during therapy should consider the risk of infant exposure, the benefits of breastfeeding to the infant, and benefits of treatment to the mother. In general, if a  infant is exposed to a first-generation antihistamine via breast milk, they should be monitored for irritability or drowsiness (Raulito 2014).  When treatment with an antihistamine is needed in breastfeeding women, second-generation antihistamines are preferred (Raulito 2014).  Antihistamines may decrease maternal serum prolactin concentrations when administered prior to the establishment of lactation (Gabriel 1985).    I will send your chart to your doctor regarding physical therapy.  You may also see ear nose and throat specialist if you are able to get in sooner.    Try to be rechecked next week.    Patient Education     Understanding Dizziness, Balance Problems, and Fainting     The eyes, inner ear, joints, and muscles send signals to the brain to achieve balance.     Balance is a group effort of the eyes, inner ear, joints, and muscles. They each send signals to the brain about body position and head movement. Then the brain uses this information to achieve balance. When the brain receives conflicting signals, or when there is a problem with blood flow, dizziness or fainting can happen.   Vertigo  Vertigo is the feeling of spinning. It may happen if the brain receives conflicting balance signals. Vertigo is often caused by a problem in the inner ear. Problems include changes in inner ear structures, infection, swelling, or excess fluid. Sometimes vertigo is due to a brain problem, such as migraine, stroke, or tumor.   Dysequilibrium  Dysequilibrium is the feeling of imbalance without a  sense of spinning. It may happen if the signal path between the body and brain is disrupted. There are many causes of dysequilibrium, including diabetes, anemia, head injury, and aging.   Syncope  Syncope is losing consciousness or fainting. The brain needs oxygen-rich blood to function. The heart pumps that blood to the brain. If there is a problem with the heart, blood flow (such as low blood pressure), or blood vessels, you may faint.   [employer] last reviewed this educational content on 5/1/2018 2000-2021 [employer]. All rights reserved. This information is not intended as a substitute for professional medical care. Always follow your healthcare professional's instructions.           Patient Education     Benign Paroxysmal Positional Vertigo     Your health care provider may move your head in certain ways to treat your BPPV.   Benign paroxysmal positional vertigo (BPPV) is a problem with the inner ear. The inner ear contains the vestibular system. This system is what helps you keep your balance. BPPV causes a feeling of spinning. It is a common problem of the vestibular system.   Understanding the vestibular system  The vestibular system of the ear is made up of very tiny parts. They include the utricle, saccule, and semicircular canals. The utricle is a tiny organ that contains calcium crystals. In some people, the crystals can move into the semicircular canals. When this happens, the system no longer works as it should. This causes BPPV. Benign means it is not life threatening. Paroxysmal means it happens suddenly. Positional means that it happens when you move your head. Vertigo is a feeling of spinning.   What causes BPPV?  Causes include injury to your head or neck. Other problems with the vestibular system may cause BPPV. In many people, the cause of BPPV is not known.   Symptoms of BPPV  You may have repeated feelings of spinning (vertigo). The vertigo usually lasts less than 1 minute.  Some movements, such as rolling over in bed, can bring on vertigo.   Diagnosing BPPV  Your primary healthcare provider may diagnose and treat your BPPV. Or you may see an ear, nose, and throat healthcare provider (otolaryngologist). In some cases, you may see a healthcare provider who focuses on the nervous system (neurologist).   The healthcare provider will ask about your symptoms and your medical history. He or she will examine you. You may have hearing and balance tests. As part of the exam, your healthcare provider may have you move your head and body in certain ways. If you have BPPV, the movements can bring on vertigo. Your provider will also look for abnormal movements of your eyes. You may have other tests to check your vestibular or nervous systems.   Treatment for BPPV  Your healthcare provider may try to move the calcium crystals. This is done by having you move your head and neck in certain ways. This treatment is safe and often works well. You may also be told to do these movements at home. You may still have vertigo for a few weeks. Your healthcare provider will recheck your symptoms, usually in about a month. Special physical therapy may also be part of treatment. In rare cases, surgery may be needed for BPPV that does not go away. Talk with your healthcare provider about whether it is safe for you to drive.   When to call the healthcare provider  Call your healthcare provider right away if you have any of these:    Symptoms that do not go away with treatment    Symptoms that get worse    New symptoms  [employer] last reviewed this educational content on 2/1/2020 2000-2021 [employer]. All rights reserved. This information is not intended as a substitute for professional medical care. Always follow your healthcare professional's instructions.

## 2022-03-03 NOTE — PROGRESS NOTES
Assessment & Plan     Dizziness    - CBC with platelets and differential  - CBC with platelets and differential    Benign paroxysmal positional vertigo, bilateral    - Otolaryngology Referral    Mother currently breast-feeding       Patient with hx of BPPV noted in chart from 2015 (patient doesn't recall details of this) with vertiginous symptoms for 10 days that do resolve with head being still for a few seconds and are worse with changes in position.  She is obviously off balance standing still.  Indicates she doesn't feel off balance while walking.  Has a headache in the posterior neck area.     Declines CT or other brain imaging for headache which wraps around either side of her face.  Did explain that was concerned that there could be a neurological issue as the cause of her vertigo.  Patient believes her headache is better with neck stretches and reiterates that it is intermittent.  Otherwise, sensation of room spinning does resolve as mentioned with head being held still.  Send this chart to her PCP Dr. Corona and reiterated my concerns that she may need brain imaging.    Patient is breast-feeding.  Did provide information about potential effects on the baby including sleepiness and irritability.  Patient declines meclizine.  Informed she can discuss with baby's provider.     Patient has PCP appointment on March 7.  Told patient to be sure to keep this appointment due to the presence of the headache with vertigo.      Advised to not drive with room spinning dizziness.     30  minutes spent on the date of the encounter doing chart review, history and exam, documentation and further activities per the note.         Return in about 5 days (around 3/8/2022).    [facility]eda [facility]    Lenard Bernstein is a 36 year old female who presents to clinic today for the following health issues:  Chief Complaint   Patient presents with     Dizziness     x 1wk, slight headaches,  no fever, no neauseous or vomiting, would like hgb checked, no conerns with vision     HPI    Room spinning dizziness starting 1 week ago to 10 days ago.  Feels off balance when standing still.  No problems walking.      Is nursing.      No illness sx    No nausea.      +Mild posterior headache which wraps around to mouth. Pain 4/10. Comes and goes.  Feels better after doing some neck exercises.  NO ear pain.  Does not think she grinds her teeth.    Hx BPPV in the past.    Started suddenly.  Worse with change in position, especially going from sitting to lying.    Is going to see GI for upper abd pain on .  +Constipation with streaks of blood.      Had .  Was on iron during pregnancy.          Review of Systems   Constitutional: Negative for chills and fever.   HENT: Negative for ear pain.    Eyes: Negative for visual disturbance.   Neurological: Positive for dizziness and headaches. Negative for speech difficulty, weakness, light-headedness, numbness and paresthesias.           Objective    /72   Pulse 64   Temp 98.2  F (36.8  C) (Oral)   Resp 16   Wt 74.5 kg (164 lb 3.2 oz)   LMP 2022   SpO2 97%   Breastfeeding No   BMI 26.50 kg/m    Physical Exam  Constitutional:       Appearance: Normal appearance.   HENT:      Right Ear: Tympanic membrane normal.      Left Ear: Tympanic membrane normal.      Ears:      Comments: No tenderness over the jaw area.  Able to open and close jaw normally.  Eyes:      General:         Right eye: No discharge.         Left eye: No discharge.      Extraocular Movements: Extraocular movements intact.      Conjunctiva/sclera: Conjunctivae normal.      Comments: No nystagmus   Pulmonary:      Effort: Pulmonary effort is normal.   Neurological:      General: No focal deficit present.      Mental Status: She is alert and oriented to person, place, and time.      Motor: No weakness.      Coordination: Coordination normal.      Comments: Obviously off  "balance with standing still.    [place]-Hallpike with \"slight\" inducible vertigo looking to the right only.  No nystagmus.   Psychiatric:         Mood and Affect: Mood normal.         Behavior: Behavior normal.         Thought Content: Thought content normal.         Judgment: Judgment normal.                  "

## 2022-03-03 NOTE — Clinical Note
Hi, Day was here today with likely vertigo.  She has appointment with you on the 7th for the same. I offered brain imagining due to my concern re: posterior headache that is intermittent starting around the same time and that she is off balance standing still while saying spinning does stop when I hold her head perfectly still after a few seconds, so spinning is not constant.  She declined.  Also declined meclizine due to breastfeeding after discussion.  She would like to try PT.  She may need an MRI with your visit.      Regards  Sherley Castaneda, CNP

## 2022-05-10 ENCOUNTER — OFFICE VISIT (OUTPATIENT)
Dept: FAMILY MEDICINE | Facility: CLINIC | Age: 37
End: 2022-05-10
Payer: COMMERCIAL

## 2022-05-10 VITALS
BODY MASS INDEX: 26.57 KG/M2 | WEIGHT: 164.6 LBS | RESPIRATION RATE: 16 BRPM | SYSTOLIC BLOOD PRESSURE: 100 MMHG | OXYGEN SATURATION: 99 % | DIASTOLIC BLOOD PRESSURE: 65 MMHG | HEART RATE: 67 BPM

## 2022-05-10 DIAGNOSIS — S61.211A LACERATION OF LEFT INDEX FINGER WITHOUT FOREIGN BODY WITHOUT DAMAGE TO NAIL, INITIAL ENCOUNTER: Primary | ICD-10-CM

## 2022-05-10 PROCEDURE — 99213 OFFICE O/P EST LOW 20 MIN: CPT | Performed by: PHYSICIAN ASSISTANT

## 2022-05-10 NOTE — PROGRESS NOTES
URGENT CARE VISIT:    SUBJECTIVE:   Day Quevedo is a 36 year old female who presents to the clinic with a laceration on the left 2nd finger sustained 1 day(s) ago.  This is a non-work related injury.  Mechanism of injury: knife.    Associated symptoms: Denies numbness, weakness, or loss of function  Last tetanus booster within 10 years: yes    OBJECTIVE:  VITALS: /65 (BP Location: Left arm, Patient Position: Sitting, Cuff Size: Adult Regular)   Pulse 67   Resp 16   Wt 74.7 kg (164 lb 9.6 oz)   SpO2 99%   BMI 26.57 kg/m    General: WDWN in NAD  Size of laceration: 2 centimeters  Location of laceration: middle phalanx of left index finger  Characteristics of the laceration: clean, superficial, and straight  Tendon function intact: yes  Sensation to light touch intact: yes  Pulses intact: yes    ASSESSMENT:    ICD-10-CM    1. Laceration of left index finger without foreign body without damage to nail, initial encounter  S61.211A        PLAN:  Patient Instructions   Patient was educated on the natural course of injury.  Lac is past window of time for sutures. Keep wound dry and clean. Wash area with soap and water. Watch for signs of infection such as redness or purulent drainage. Conservative measures discussed including over-the-counter Tylenol as needed for pain. See your primary care provider in 5 days if there is no improvement or sooner as needed. Seek emergency care if you develop fever, streaking, severe pain or rapidly spreading redness.       Patient verbalized understanding and is agreeable to plan. The patient was discharged ambulatory and in stable condition.    Rachel Loving PA-C ....................  5/10/2022   5:23 PM

## 2022-08-24 ENCOUNTER — HOSPITAL ENCOUNTER (EMERGENCY)
Facility: HOSPITAL | Age: 37
Discharge: HOME | End: 2022-08-24
Admitting: PHYSICIAN ASSISTANT
Payer: COMMERCIAL

## 2022-08-24 VITALS
DIASTOLIC BLOOD PRESSURE: 72 MMHG | TEMPERATURE: 98.2 F | RESPIRATION RATE: 16 BRPM | HEART RATE: 76 BPM | SYSTOLIC BLOOD PRESSURE: 126 MMHG | OXYGEN SATURATION: 99 %

## 2022-08-24 DIAGNOSIS — S61.112A LACERATION OF LEFT THUMB WITHOUT FOREIGN BODY WITH DAMAGE TO NAIL, INITIAL ENCOUNTER: ICD-10-CM

## 2022-08-24 PROBLEM — R42 DIZZINESS: Status: ACTIVE | Noted: 2021-08-24

## 2022-08-24 PROBLEM — I95.1 ORTHOSTATIC HYPOTENSION: Status: ACTIVE | Noted: 2021-08-24

## 2022-08-24 PROBLEM — A04.8 HELICOBACTER PYLORI INFECTION: Status: ACTIVE | Noted: 2021-08-24

## 2022-08-24 PROCEDURE — 12001 RPR S/N/AX/GEN/TRNK 2.5CM/<: CPT

## 2022-08-24 PROCEDURE — 99283 EMERGENCY DEPT VISIT LOW MDM: CPT

## 2022-08-24 ASSESSMENT — ENCOUNTER SYMPTOMS
FEVER: 0
WOUND: 1

## 2022-08-24 NOTE — ED NOTES
ED Triage Provider Note  [facility]  Encounter Date: Aug 24, 2022    History:  Chief Complaint   Patient presents with     Laceration     Day Quevedo is a 37 year old female who presents to the ED with left thumb laceration. Cut it with knife while cutting vegetables. Last tetanus 2018.    Review of Systems:  Review of Systems   Constitutional: Negative for fever.   Skin: Positive for wound.        Exam:  /72   Pulse 76   Temp 98.2  F (36.8  C) (Temporal)   Resp 16   LMP 08/09/2022   SpO2 99%   General: No acute distress. Appears stated age.   Cardio: Regular rate, extremities well perfused  Resp: Normal work of breathing, grossly normal respiratory rate  Neuro: Alert. CN II-XII grossly intact. Grossly intact strength.   Skin: laceration to the distal left thumb involving distal nail, oozing blood    Medical Decision Making:  Patient arriving to the ED with problem as above. A medical screening exam was performed. Plan to have her apply pressure to area while in waiting room and dressing placed by nursing here.. The patient is appropriate to wait in triage.    Interventions:  N/A    Diagnosis:  Finger laceration       Melissa Hernandez MD  08/24/22 1417

## 2022-08-24 NOTE — ED TRIAGE NOTES
C/o left thumb lac onset 30 minutes ago while cutting vegetables at home. Dressing in place. Bleeding appears controlled. Distal thumb is somewhat numb.

## 2022-08-24 NOTE — DISCHARGE INSTRUCTIONS
You were seen here and evaluated for evaluation of laceration to your left arm.  We were able to get the bleeding controlled, put glue over this.  Recommend they keep it covered.  Tetanus was up-to-date.  Please follow-up with your primary care doctor as indicated.    For pain or fever you may use:  -Tylenol 650 mg every 6 hours.  Max 4000 mg in 24 hours  Do not use thismedication with alcohol as it can cause liver problems.  -Ibuprofen 600 mg every 6 hours.  Max 3500 mg in 24 hours  Do not take this medication if you have a history of a GI bleed or have kidney problems.  You may use both of these medications at the same time or you can alternate them every 3 hours.  For example, Tylenol at 6 AM, ibuprofen at 9 AM, Tylenol at noon, etc.

## 2022-08-24 NOTE — ED PROVIDER NOTES
EMERGENCY DEPARTMENT ENCOUNTER      NAME: Day Quevedo  AGE: 37 year old female  YOB: 1985  MRN: [id number]  EVALUATION DATE & TIME: No admission date for patient encounter.    PCP: Gaviota Lepe    ED PROVIDER: Diego Mojica PA-C      Chief Complaint   Patient presents with     Laceration       FINAL IMPRESSION:  1. Laceration of left thumb without foreign body with damage to nail, initial encounter      ED COURSE & MEDICAL DECISION MAKING:    Pertinent Labs & Imaging studies reviewed. (See chart for details)  3:12 PM I met the patient and performed my initial interview and exam.   3:29 PM I performed a laceration repair.   3:50 PM I removed the finger tourniquet. I discussed the plan for discharge with the patient, and patient is agreeable. We discussed supportivecares at home and reasons for return to the ER including new or worsening symptoms - all questions and concerns addressed. Patient to be discharged by RN.   3:59 PM Patient will be discharged.     37 year old female presents to the Emergency Department for evaluation of left thumb laceration.     ED Course as of 08/24/22 1600   Wed Aug 24, 2022   1527 Patient is a 37-year-old female presents emergency department for evaluation of left thumb laceration.  She notes that she cut this during cutting vegetables, last tetanus was in 2018.  There is a small abrasion with a little bit of nail missing over the distal part of the thumb, there is no flap, no sutures could be applied in this.  Will likely require some glue, to help control bleeding.  No evidence of any infection, no tendon damage, she is able to move her finger without much difficulty.  No other acute abnormalities on examination.  No other acute complaints here today.  We will attempt to repair the laceration by performing a digital block, finger tourniquet will be placed, attempted control the bleeding, and then the finger will likely be glued shut.  Do not think she  requires any further work-up or imaging at this time.  No indication for any other labs, or testing.  Plan for discharge following laceration repair.        At the conclusion of the encounter I discussed the results of all of the tests and the disposition. The questions were answered. The patient or family acknowledged understanding and was agreeable with the care plan.       0 minutes of critical care time     MEDICATIONS GIVEN IN THE EMERGENCY:  Medications - No data to display    NEW PRESCRIPTIONS STARTED AT TODAY'S ER VISIT  New Prescriptions    No medications on file        =================================================================    HPI    Patient information was obtained from: the patient    Use of : N/A    Day Quevedo is a 37 year old year old female with a pertinent medical history of dizziness, orthostatic hypotension who presents to the ED by personal vehicle for the evaluation of laceration.    The patient presents to the ED reporting she was cutting vegetables today when she sliced her left thumb and cut her nail.  She endorses bleeding and pain to the area.    Tdap from 2018.     The patient denies any other symptoms at this time.       REVIEW OF SYSTEMS   Review of Systems   Skin: Positive for wound (laceration to left thumb - with bleeding and pain).   All other systems reviewed and are negative.      PAST MEDICAL HISTORY:  Past Medical History:   Diagnosis Date     Uterine prolapse        PAST SURGICAL HISTORY:  Past Surgical History:   Procedure Laterality Date      SECTION N/A 2021    Procedure: PRIMARY  SECTION;  Surgeon: Gaviota Lepe MD;  Location: [facility] L+D OR;  Service: Obstetrics       CURRENT MEDICATIONS:    amoxicillin (AMOXIL) 500 MG capsule  clotrimazole-betamethasone (LOTRISONE) 1-0.05 % external cream  prenatal vitamin iron-folic acid 27mg-0.8mg (PRENATAL S) 27 mg iron- 800 mcg Tab tablet  psyllium (METAMUCIL SMOOTH TEXTURE) 28  % packet         ALLERGIES:  No Known Allergies    FAMILY HISTORY:  Family History   Problem Relation Age of Onset     Diabetes Mother      Myocardial Infarction Mother      Cerebrovascular Disease Mother        SOCIAL HISTORY:   Social History     Socioeconomic History     Marital status:    Tobacco Use     Smoking status: Never Smoker     Smokeless tobacco: Never Used   Substance and Sexual Activity     Alcohol use: No     Drug use: No     Sexual activity: Yes     Partners: Male       VITALS:  /72   Pulse 76   Temp 98.2  F (36.8  C) (Temporal)   Resp 16   LMP 08/09/2022   SpO2 99%     PHYSICAL EXAM    Physical Exam  Vitals and nursing note reviewed.   Constitutional:       General: She is not in acute distress.     Appearance: Normal appearance. She is normal weight. She is not toxic-appearing or diaphoretic.   HENT:      Right Ear: External ear normal.      Left Ear: External ear normal.   Eyes:      Conjunctiva/sclera: Conjunctivae normal.   Musculoskeletal:      Cervical back: Normal range of motion.   Skin:     Findings: Laceration present.      Comments: Laceration present to the left thumb, abrasion, part of the nail missing, no flap that can be covered.    Neurological:      Mental Status: She is alert. Mental status is at baseline.         LAB:  All pertinent labs reviewed and interpreted.  Labs Ordered and Resulted from Time of ED Arrival to Time of ED Departure - No data to display    RADIOLOGY:  Reviewed all pertinent imaging. Please see official radiology report.  No orders to display       PROCEDURES:   PROCEDURE: Laceration Repair   INDICATIONS: Laceration   PROCEDURE PROVIDER: Louis Mojica PA-C   SITE: Left Thumb Tip   TYPE/SIZE: simple, clean and no foreign body visualized  0.5 cm (total length)   FUNCTIONAL ASSESSMENT: Distal sensation, circulation, motor and tendon function intact   MEDICATION: 3.5 mLs of 1% Lidocaine with epinephrine   PREPARATION: irrigation with Normal  saline   DEBRIDEMENT: no debridement   CLOSURE:  Superficial layer closed with Dermabond (medical glue)     I, Scott Zheng, am serving as a scribe to document services personally performed by Diego Mojica PA-C, based on my observation and the provider's statements to me. I, Diego Mojica PA-C, attest that Scott Zheng is acting in a scribe capacity, has observed my performance of the services and has documented them in accordance with my direction.    Diego Mojica PA-C  Emergency Medicine  [facility] EMERGENCY DEPARTMENT  [facility] [facility] [address]  [phone]  Dept: [phone]     Diego Mojica PA-C  08/24/22 3938

## 2022-10-01 ENCOUNTER — HEALTH MAINTENANCE LETTER (OUTPATIENT)
Age: 37
End: 2022-10-01

## 2023-01-12 ENCOUNTER — TRANSFERRED RECORDS (OUTPATIENT)
Dept: HEALTH INFORMATION MANAGEMENT | Facility: CLINIC | Age: 38
End: 2023-01-12
Payer: COMMERCIAL

## 2023-02-05 ENCOUNTER — HEALTH MAINTENANCE LETTER (OUTPATIENT)
Age: 38
End: 2023-02-05

## 2023-02-20 ENCOUNTER — OFFICE VISIT (OUTPATIENT)
Dept: FAMILY MEDICINE | Facility: CLINIC | Age: 38
End: 2023-02-20
Payer: COMMERCIAL

## 2023-02-20 ENCOUNTER — HOSPITAL ENCOUNTER (OUTPATIENT)
Dept: GENERAL RADIOLOGY | Facility: HOSPITAL | Age: 38
Discharge: HOME | End: 2023-02-20
Admitting: STUDENT IN AN ORGANIZED HEALTH CARE EDUCATION/TRAINING PROGRAM
Payer: COMMERCIAL

## 2023-02-20 VITALS
HEART RATE: 71 BPM | OXYGEN SATURATION: 95 % | TEMPERATURE: 98.7 F | RESPIRATION RATE: 12 BRPM | SYSTOLIC BLOOD PRESSURE: 118 MMHG | DIASTOLIC BLOOD PRESSURE: 83 MMHG

## 2023-02-20 DIAGNOSIS — S49.91XA SHOULDER INJURY, RIGHT, INITIAL ENCOUNTER: ICD-10-CM

## 2023-02-20 DIAGNOSIS — S49.91XA SHOULDER INJURY, RIGHT, INITIAL ENCOUNTER: Primary | ICD-10-CM

## 2023-02-20 PROCEDURE — 99213 OFFICE O/P EST LOW 20 MIN: CPT | Performed by: STUDENT IN AN ORGANIZED HEALTH CARE EDUCATION/TRAINING PROGRAM

## 2023-02-20 PROCEDURE — 73030 X-RAY EXAM OF SHOULDER: CPT | Mod: RT

## 2023-02-21 NOTE — PROGRESS NOTES
Assessment & Plan     Shoulder injury, right, initial encounter    Reassured by Xray, plan to follow up if not improving in the next 1-2 weeks with PCP. Recommend ibuprofen and tylenol for PRN pain relief. Recommended ongoing icing for pain relief.   - XR Shoulder Right G/E 3 Views      Plans to est with PCP, Dr. De Santiago.     Nica Ureña MD  [facility] RONEL Bernstein is a 37 year old, presenting for the following health issues:  Shoulder Injury (Right shoulder pain due to injury)      HPI     Went for a walk, was moving quickly.  Slipped on the ice.   Was holding her phone and trying to make a call.  Fell with arm extended behind on her R shoulder.  Fell onto R shoulder.    Pain mainly at AC joint.   Noting some weakness and pain radiating from shoulder down to hand.  No numbness.    Has never had an injury to this shoulder before.    No loss of consciousness, did not hit head.    Has been icing and took ibuprofen--helping some with pain.    Review of Systems   Constitutional, HEENT, cardiovascular, pulmonary, gi and gu systems are negative, except as otherwise noted.      Objective    /83   Pulse 71   Temp 98.7  F (37.1  C) (Oral)   Resp 12   SpO2 95%   There is no height or weight on file to calculate BMI.  Physical Exam   GENERAL: healthy, alert and no distress  EYES: Eyes grossly normal to inspection, PERRL and conjunctivae and sclerae normal  NECK: no adenopathy, no asymmetry, masses, or scars and thyroid normal to palpation  RESP: lungs clear to auscultation - no rales, rhonchi or wheezes  MS: difficult to assess strength as patient is limited by pain--able to move arms and hand well enough to remove sweater for exam, pain to palpation over R AC joint, no pain to palpation of R olecranon, no pain to palpation over R wrist, is able to move elbow and wrist freely--but with pain illicted in her shoulder.  SKIN: small ecchymosis on R arm approximately 4 cm below  humeral head, no other suspicious lesions or rashes  NEURO: Normal strength and tone, mentation intact and speech normal    Xray - Reviewed and interpreted by me.  Normal Xray.

## 2023-02-24 ENCOUNTER — TELEPHONE (OUTPATIENT)
Dept: FAMILY MEDICINE | Facility: CLINIC | Age: 38
End: 2023-02-24
Payer: COMMERCIAL

## 2023-02-24 NOTE — TELEPHONE ENCOUNTER
Patient is scheduled on the [facility] schedule 2/27/23 for a TB mantoux placement, with no orders. Patient does not see primary care within the system and will need an appointment with a clinician to have this order placed.    I called patient and she states she has an upcoming appointment with Dr. De Santiago and will request an order from them. We will hold for now to see what happens.

## 2023-02-27 NOTE — TELEPHONE ENCOUNTER
I spoke with patient and she stated she is going to check with her employer to see if they will provide Tb testing. I also mentioned to her that [facility] clinics do provide same day mantoux placements and that she can easily schedule at a [facility] location near her. We will cancel this appointment as she is going elsewhere for the placement. Patient was understanding and did not have any further questions.    Orestes Handley, CMA

## 2023-04-07 ENCOUNTER — OFFICE VISIT (OUTPATIENT)
Dept: FAMILY MEDICINE | Facility: CLINIC | Age: 38
End: 2023-04-07
Payer: COMMERCIAL

## 2023-04-07 VITALS
SYSTOLIC BLOOD PRESSURE: 106 MMHG | BODY MASS INDEX: 28.35 KG/M2 | OXYGEN SATURATION: 99 % | HEART RATE: 77 BPM | WEIGHT: 176.4 LBS | HEIGHT: 66 IN | TEMPERATURE: 98.6 F | DIASTOLIC BLOOD PRESSURE: 72 MMHG

## 2023-04-07 DIAGNOSIS — M25.511 ACUTE PAIN OF RIGHT SHOULDER: ICD-10-CM

## 2023-04-07 DIAGNOSIS — Z00.00 ROUTINE GENERAL MEDICAL EXAMINATION AT A HEALTH CARE FACILITY: Primary | ICD-10-CM

## 2023-04-07 DIAGNOSIS — Z11.4 SCREENING FOR HIV (HUMAN IMMUNODEFICIENCY VIRUS): ICD-10-CM

## 2023-04-07 DIAGNOSIS — M54.50 CHRONIC BILATERAL LOW BACK PAIN WITHOUT SCIATICA: ICD-10-CM

## 2023-04-07 DIAGNOSIS — L60.8 CHANGE IN NAIL APPEARANCE: ICD-10-CM

## 2023-04-07 DIAGNOSIS — G89.29 CHRONIC BILATERAL LOW BACK PAIN WITHOUT SCIATICA: ICD-10-CM

## 2023-04-07 DIAGNOSIS — Z11.59 NEED FOR HEPATITIS C SCREENING TEST: ICD-10-CM

## 2023-04-07 PROBLEM — N81.2 UTEROVAGINAL PROLAPSE, INCOMPLETE: Status: ACTIVE | Noted: 2023-04-07

## 2023-04-07 PROBLEM — K59.00 CONSTIPATION, UNSPECIFIED CONSTIPATION TYPE: Status: ACTIVE | Noted: 2023-04-07

## 2023-04-07 PROBLEM — N95.2 VAGINAL ATROPHY: Status: ACTIVE | Noted: 2023-04-07

## 2023-04-07 PROBLEM — N94.6 DYSMENORRHEA: Status: ACTIVE | Noted: 2023-04-07

## 2023-04-07 PROBLEM — O99.019 ANEMIA AFFECTING THIRD PREGNANCY: Status: ACTIVE | Noted: 2023-04-07

## 2023-04-07 PROBLEM — N81.11 CYSTOCELE, MIDLINE: Status: ACTIVE | Noted: 2023-04-07

## 2023-04-07 LAB
BASOPHILS # BLD AUTO: 0 10E3/UL (ref 0–0.2)
BASOPHILS NFR BLD AUTO: 0 %
EOSINOPHIL # BLD AUTO: 0.1 10E3/UL (ref 0–0.7)
EOSINOPHIL NFR BLD AUTO: 1 %
ERYTHROCYTE [DISTWIDTH] IN BLOOD BY AUTOMATED COUNT: 12.7 % (ref 10–15)
HCT VFR BLD AUTO: 36.7 % (ref 35–47)
HCV AB SERPL QL IA: NONREACTIVE
HGB BLD-MCNC: 12.1 G/DL (ref 11.7–15.7)
HIV 1+2 AB+HIV1 P24 AG SERPL QL IA: NONREACTIVE
IMM GRANULOCYTES # BLD: 0 10E3/UL
IMM GRANULOCYTES NFR BLD: 0 %
IRON BINDING CAPACITY (ROCHE): 310 UG/DL (ref 240–430)
IRON SATN MFR SERPL: 27 % (ref 15–46)
IRON SERPL-MCNC: 85 UG/DL (ref 37–145)
LYMPHOCYTES # BLD AUTO: 2 10E3/UL (ref 0.8–5.3)
LYMPHOCYTES NFR BLD AUTO: 37 %
MCH RBC QN AUTO: 29.5 PG (ref 26.5–33)
MCHC RBC AUTO-ENTMCNC: 33 G/DL (ref 31.5–36.5)
MCV RBC AUTO: 90 FL (ref 78–100)
MONOCYTES # BLD AUTO: 0.2 10E3/UL (ref 0–1.3)
MONOCYTES NFR BLD AUTO: 4 %
NEUTROPHILS # BLD AUTO: 3.2 10E3/UL (ref 1.6–8.3)
NEUTROPHILS NFR BLD AUTO: 57 %
PLATELET # BLD AUTO: 164 10E3/UL (ref 150–450)
RBC # BLD AUTO: 4.1 10E6/UL (ref 3.8–5.2)
T4 FREE SERPL-MCNC: 1.06 NG/DL (ref 0.9–1.7)
TSH SERPL DL<=0.005 MIU/L-ACNC: 2.52 UIU/ML (ref 0.3–4.2)
WBC # BLD AUTO: 5.6 10E3/UL (ref 4–11)

## 2023-04-07 PROCEDURE — 84443 ASSAY THYROID STIM HORMONE: CPT | Performed by: FAMILY MEDICINE

## 2023-04-07 PROCEDURE — 86803 HEPATITIS C AB TEST: CPT | Performed by: FAMILY MEDICINE

## 2023-04-07 PROCEDURE — 83550 IRON BINDING TEST: CPT | Performed by: FAMILY MEDICINE

## 2023-04-07 PROCEDURE — 36415 COLL VENOUS BLD VENIPUNCTURE: CPT | Performed by: FAMILY MEDICINE

## 2023-04-07 PROCEDURE — 87389 HIV-1 AG W/HIV-1&-2 AB AG IA: CPT | Performed by: FAMILY MEDICINE

## 2023-04-07 PROCEDURE — 83540 ASSAY OF IRON: CPT | Performed by: FAMILY MEDICINE

## 2023-04-07 PROCEDURE — 99395 PREV VISIT EST AGE 18-39: CPT | Mod: 25 | Performed by: FAMILY MEDICINE

## 2023-04-07 PROCEDURE — 99213 OFFICE O/P EST LOW 20 MIN: CPT | Mod: 25 | Performed by: FAMILY MEDICINE

## 2023-04-07 PROCEDURE — 85025 COMPLETE CBC W/AUTO DIFF WBC: CPT | Performed by: FAMILY MEDICINE

## 2023-04-07 PROCEDURE — 84439 ASSAY OF FREE THYROXINE: CPT | Performed by: FAMILY MEDICINE

## 2023-04-07 ASSESSMENT — ENCOUNTER SYMPTOMS
WEAKNESS: 0
CONSTIPATION: 0
DIZZINESS: 0
FEVER: 0
HEMATURIA: 0
HEMATOCHEZIA: 0
MYALGIAS: 0
HEARTBURN: 0
COUGH: 0
EYE PAIN: 0
HEADACHES: 0
NAUSEA: 0
BREAST MASS: 0
ABDOMINAL PAIN: 0
FREQUENCY: 0
DYSURIA: 0
PALPITATIONS: 0
CHILLS: 0
PARESTHESIAS: 0
NERVOUS/ANXIOUS: 0
DIARRHEA: 0
SHORTNESS OF BREATH: 0
JOINT SWELLING: 0
SORE THROAT: 0
ARTHRALGIAS: 0

## 2023-04-07 ASSESSMENT — PAIN SCALES - GENERAL: PAINLEVEL: MODERATE PAIN (4)

## 2023-04-07 NOTE — PATIENT INSTRUCTIONS
magnesium gluconate or glycinate - take it at night two 200 mg capsules  2.  Continue boric acid for the vaginosis.   3.  You will need to call [facility], and the referral will be faxed there.   4. The dental clinic will call you.     Preventive Health Recommendations  Female Ages 26 - 39  Yearly exam:   See your health care provider every year in order to  Review health changes.   Discuss preventive care.    Review your medicines if you your doctor has prescribed any.    Until age 30: Get a Pap test every three years (more often if you have had an abnormal result).    After age 30: Talk to your doctor about whether you should have a Pap test every 3 years or have a Pap test with HPV screening every 5 years.   You do not need a Pap test if your uterus was removed (hysterectomy) and you have not had cancer.  You should be tested each year for STDs (sexually transmitted diseases), if you're at risk.   Talk to your provider about how often to have your cholesterol checked.  If you are at risk for diabetes, you should have a diabetes test (fasting glucose).  Shots: Get a flu shot each year. Get a tetanus shot every 10 years.   Nutrition:   Eat at least 5 servings of fruits and vegetables each day.  Eat whole-grain bread, whole-wheat pasta and brown rice instead of white grains and rice.  Get adequate Calcium and Vitamin D.     Lifestyle  Exercise at least 150 minutes a week (30 minutes a day, 5 days of the week). This will help you control your weight and prevent disease.  Limit alcohol to one drink per day.  No smoking.   Wear sunscreen to prevent skin cancer.  See your dentist every six months for an exam and cleaning.

## 2023-04-07 NOTE — PROGRESS NOTES
SUBJECTIVE:   CC: Day is an 37 year old who presents for preventive health visit.       8/11/2021    10:58 AM   Additional Questions   Roomed by dashawn FOY     Patient has been advised of split billing requirements and indicates understanding: Yes  Healthy Habits:     Getting at least 3 servings of Calcium per day:  NO    Bi-annual eye exam:  NO    Dental care twice a year:  Yes    Sleep apnea or symptoms of sleep apnea:  None    Diet:  Regular (no restrictions)    Frequency of exercise:  2-3 days/week    Duration of exercise:  15-30 minutes    Taking medications regularly:  Not Applicable    Medication side effects:  Not applicable    PHQ-2 Total Score: 0    Additional concerns today:  No    Low back pain  -Follows with physical therapy, likes her current therapist.     Shoulder Pain  -Occurred at the AC joint  -History of stomach issues so cannot due extended courses of ibuprofen  -Happened a month ago  -Occurred with shoulder abducted and extended (external rotation)  -Patient reports of restricted motion  That's now improving. No swelling. Doing lots of ice, cayenne pepper/olive oil compound    Today's PHQ-2 Score:       4/7/2023     9:15 AM   PHQ-2 ( 1999 [employer])   Q1: Little interest or pleasure in doing things 0   Q2: Feeling down, depressed or hopeless 0   PHQ-2 Score 0   Q1: Little interest or pleasure in doing things Not at all    Not at all   Q2: Feeling down, depressed or hopeless Not at all    Not at all   PHQ-2 Score 0    0     Have you ever done Advance Care Planning? (For example, a Health Directive, POLST, or a discussion with a medical provider or your loved ones about your wishes): Yes, advance care planning is on file.    Social History     Tobacco Use     Smoking status: Never     Smokeless tobacco: Never   Vaping Use     Vaping status: Not on file   Substance Use Topics     Alcohol use: No         4/7/2023     9:15 AM   Alcohol Use   Prescreen: >3 drinks/day or >7 drinks/week? No  "    Reviewed orders with patient.  Reviewed health maintenance and updated orders accordingly - Yes  Lab work is in process  Labs reviewed in EPIC    Breast Cancer Screening:  Any new diagnosis of family breast, ovarian, or bowel cancer? No    FHS-7:        View : No data to display.                Patient under 40 years of age: Routine Mammogram Screening not recommended.   Pertinent mammograms are reviewed under the imaging tab. No symptoms.     History of abnormal Pap smear: NO - age 30-65 PAP every 5 years with negative HPV co-testing recommended     Reviewed and updated as needed this visit by clinical staff   Tobacco  Allergies  Meds  Problems  Med Hx  Surg Hx  Fam Hx          Reviewed and updated as needed this visit by Provider   Tobacco  Allergies  Meds  Problems  Med Hx  Surg Hx  Fam Hx           Review of Systems   Constitutional: Negative for chills and fever.   HENT: Negative for congestion, ear pain, hearing loss and sore throat.    Eyes: Negative for pain and visual disturbance.   Respiratory: Negative for cough and shortness of breath.    Cardiovascular: Negative for chest pain, palpitations and peripheral edema.   Gastrointestinal: Negative for abdominal pain, constipation, diarrhea, heartburn, hematochezia and nausea.   Breasts:  Negative for tenderness, breast mass and discharge.   Genitourinary: Positive for vaginal discharge. Negative for dysuria, frequency, genital sores, hematuria, pelvic pain, urgency and vaginal bleeding.   Musculoskeletal: Negative for arthralgias, joint swelling and myalgias.   Skin: Negative for rash.   Neurological: Negative for dizziness, weakness, headaches and paresthesias.   Psychiatric/Behavioral: Negative for mood changes. The patient is not nervous/anxious.       OBJECTIVE:   /72 (BP Location: Right arm, Patient Position: Sitting, Cuff Size: Adult Regular)   Pulse 77   Temp 98.6  F (37  C) (Oral)   Ht 1.676 m (5' 6\")   Wt 80 kg (176 lb 6.4 " oz)   SpO2 99%   BMI 28.47 kg/m    Physical Exam  GENERAL: healthy, alert and no distress  EYES: Eyes grossly normal to inspection, PERRL and conjunctivae and sclerae normal  HENT: ear canals and TM's normal, nose and mouth without ulcers or lesions  NECK: no adenopathy, no asymmetry, masses, or scars and thyroid normal to palpation  RESP: lungs clear to auscultation - no rales, rhonchi or wheezes  CV: regular rate and rhythm, normal S1 S2, no S3 or S4, no murmur, click or rub, no peripheral edema and peripheral pulses strong  ABDOMEN: soft, nontender, no hepatosplenomegaly, no masses and bowel sounds normal  MS: no gross musculoskeletal defects noted, no edema  SKIN: no suspicious lesions or rashes  PSYCH: mentation appears normal, affect normal/bright    Diagnostic Test Results:  Results for orders placed or performed in visit on 04/07/23   Iron and iron binding capacity     Status: Normal   Result Value Ref Range    Iron 85 37 - 145 ug/dL    Iron Binding Capacity 310 240 - 430 ug/dL    Iron Sat Index 27 15 - 46 %   TSH     Status: Normal   Result Value Ref Range    TSH 2.52 0.30 - 4.20 uIU/mL   T4, free     Status: Normal   Result Value Ref Range    Free T4 1.06 0.90 - 1.70 ng/dL   HIV Antigen Antibody Combo     Status: Normal   Result Value Ref Range    HIV Antigen Antibody Combo Nonreactive Nonreactive   Hepatitis C antibody     Status: Normal   Result Value Ref Range    Hepatitis C Antibody Nonreactive Nonreactive    Narrative    Assay performance characteristics have not been established for newborns, infants, and children.   CBC with platelets and differential     Status: None   Result Value Ref Range    WBC Count 5.6 4.0 - 11.0 10e3/uL    RBC Count 4.10 3.80 - 5.20 10e6/uL    Hemoglobin 12.1 11.7 - 15.7 g/dL    Hematocrit 36.7 35.0 - 47.0 %    MCV 90 78 - 100 fL    MCH 29.5 26.5 - 33.0 pg    MCHC 33.0 31.5 - 36.5 g/dL    RDW 12.7 10.0 - 15.0 %    Platelet Count 164 150 - 450 10e3/uL    % Neutrophils 57 %     % Lymphocytes 37 %    % Monocytes 4 %    % Eosinophils 1 %    % Basophils 0 %    % Immature Granulocytes 0 %    Absolute Neutrophils 3.2 1.6 - 8.3 10e3/uL    Absolute Lymphocytes 2.0 0.8 - 5.3 10e3/uL    Absolute Monocytes 0.2 0.0 - 1.3 10e3/uL    Absolute Eosinophils 0.1 0.0 - 0.7 10e3/uL    Absolute Basophils 0.0 0.0 - 0.2 10e3/uL    Absolute Immature Granulocytes 0.0 <=0.4 10e3/uL   CBC with platelets and differential     Status: None    Narrative    The following orders were created for panel order CBC with platelets and differential.  Procedure                               Abnormality         Status                     ---------                               -----------         ------                     CBC with platelets and d...[[id number]]                      Final result                 Please view results for these tests on the individual orders.       ASSESSMENT/PLAN:   Day was seen today for physical and fall.    Diagnoses and all orders for this visit:    Routine general medical examination at a health care facility  -     PRIMARY CARE FOLLOW-UP SCHEDULING; Future    Change in nail appearance  Chronic. Patient with longitudinal and transverse nail ridges. No pitting, thickening, or yellowing of the skin. Will assess for nutritional deficiencies, otherwise can continue to follow.   -     CBC with platelets and differential; Future  -     Iron and iron binding capacity; Future  -     TSH; Future  -     T4, free; Future  -     REVIEW OF HEALTH MAINTENANCE PROTOCOL ORDERS  -     CBC with platelets and differential  -     Iron and iron binding capacity  -     TSH  -     T4, free    Acute pain of right shoulder  Acute flare up of chronic back pain, this is likely radiating or muscle compensation. Recommend PT for possible further manipulative techniques.   -     Physical Therapy Referral; Future    Screening for HIV (human immunodeficiency virus)  -     HIV Antigen Antibody Combo; Future  -     HIV  Antigen Antibody Combo    Need for hepatitis C screening test  -     Hepatitis C antibody; Future  -     Hepatitis C antibody    Chronic bilateral low back pain without sciatica  Chronic, worsening. Recommend physical therapy separately for the two issues.   -     Physical Therapy Referral; Future    Other orders  -     REVIEW OF HEALTH MAINTENANCE PROTOCOL ORDERS        Patient has been advised of split billing requirements and indicates understanding: Yes      COUNSELING:  Reviewed preventive health counseling, as reflected in patient instructions        She reports that she has never smoked. She has never used smokeless tobacco.      Eliane De Santiago [facility]

## 2023-04-10 NOTE — RESULT ENCOUNTER NOTE
Dear Day,     Here are your recent results which are within the expected range. Please continue with your current plan of care and let us know if you have any questions or concerns.    Regards,  Eliane De Santiago, DO

## 2023-12-04 DIAGNOSIS — R68.84 JAW PAIN: Primary | ICD-10-CM

## 2024-02-29 ENCOUNTER — OFFICE VISIT (OUTPATIENT)
Dept: FAMILY MEDICINE | Facility: CLINIC | Age: 39
End: 2024-02-29
Payer: COMMERCIAL

## 2024-02-29 VITALS
TEMPERATURE: 99.9 F | SYSTOLIC BLOOD PRESSURE: 126 MMHG | BODY MASS INDEX: 29.54 KG/M2 | HEART RATE: 90 BPM | RESPIRATION RATE: 16 BRPM | DIASTOLIC BLOOD PRESSURE: 75 MMHG | OXYGEN SATURATION: 100 % | WEIGHT: 183 LBS

## 2024-02-29 DIAGNOSIS — J02.9 SORE THROAT: Primary | ICD-10-CM

## 2024-02-29 DIAGNOSIS — J02.0 ACUTE STREPTOCOCCAL PHARYNGITIS: ICD-10-CM

## 2024-02-29 LAB — DEPRECATED S PYO AG THROAT QL EIA: POSITIVE

## 2024-02-29 PROCEDURE — 87880 STREP A ASSAY W/OPTIC: CPT | Performed by: STUDENT IN AN ORGANIZED HEALTH CARE EDUCATION/TRAINING PROGRAM

## 2024-02-29 PROCEDURE — 99213 OFFICE O/P EST LOW 20 MIN: CPT | Performed by: STUDENT IN AN ORGANIZED HEALTH CARE EDUCATION/TRAINING PROGRAM

## 2024-02-29 NOTE — PROGRESS NOTES
Assessment & Plan     Acute streptococcal pharyngitis  Sore throat  Will treat patient with supportive and symptomatic measures for pharyngitis at this time which include: Fluids, rest, over-the-counter medications; cough suppressants, decongestants, antihistamines, and expectorants, side effects of medications reviewed. Educated patient that honey, warm fluids and gargling saltwater can also help. She is also planning on discussing tonsil surgery with her ENT provider.   Additionally tested for bacterial cause and test was positive for streptococcal A, an antibiotic prescription was supplied to the pharmacy, side effects of medications reviewed. Additionally we discussed if symptoms do not improve after starting today's treatment (or if symptoms worsen) to follow up in 5-7 days. Educated patient on the warning signs of a peritonsillar abscess and to report to the emergency department if they notice any of these (trismus, dysphonia, uvular deviation, unilateral edema of peritonsillar region)   - Streptococcus A Rapid Screen w/Reflex to PCR - Clinic Collect  - amoxicillin-clavulanate (AUGMENTIN) 875-125 MG tablet     17 minutes spent by me on the date of the encounter doing chart review, history and exam, documentation and further activities per the note. Billed based on complexity of care.     No follow-ups on file.    Sindhu Allred MD  [facility]     Day is a 38 year old female who presents to clinic today for the following health issues:  Chief Complaint   Patient presents with    Pharyngitis     X 2 day, throat pain, has ENT appt next week. Advised to come here for tonsillitis or strep; was prescribed medication 1 month ago- never took the medication and wants to see if she can just use that medication instead of picking something else up. Has joint pain and ear pain      HPI    Yesterday started to have a lot of aching in her neck and lymph nodes. Her tonsils were  bigger and irritated. Harder to swallow or drink anything. Started having some joint pain.  told her she had white stuff on her tonsils. Having some left ear pain. An appointment soon with ENT and they recommended that she be evaluated for strep.     URI Adult    Onset of symptoms was 1-2 day(s) ago.  Course of illness is worsening.    Severity moderate  Current and Associated symptoms: chills, sweats, ear pain left, sore throat, hoarse voice, body aches, and fatigue  Treatment measures tried include None tried.  Predisposing factors include HX of chronic sinusitis.    Review of Systems  Constitutional, HEENT, cardiovascular, pulmonary, gi and gu systems are negative, except as otherwise noted.      Objective    /75 (BP Location: Right arm, Patient Position: Sitting, Cuff Size: Adult Regular)   Pulse 90   Temp 99.9  F (37.7  C) (Oral)   Resp 16   Wt 83 kg (183 lb)   LMP 01/31/2024 (Approximate)   SpO2 100%   BMI 29.54 kg/m    Physical Exam   GENERAL: alert and no distress  EYES: Eyes grossly normal to inspection, PERRL and conjunctivae and sclerae normal  HENT: normal cephalic/atraumatic, ear canals and TM's normal, clear effusion with bulging membrane, nose and mouth without ulcers or lesions, nasal mucosa edematous , rhinorrhea clear, oropharynx clear, oral mucous membranes moist, tonsillar hypertrophy, tonsillar erythema, tonsillar exudate, sinuses: not tender, and posterior pharyngeal erythema  NECK: no adenopathy, no asymmetry, masses, or scars  RESP: lungs clear to auscultation - no rales, rhonchi or wheezes  CV: regular rate and rhythm, normal S1 S2, no S3 or S4, no murmur, click or rub, no peripheral edema  MS: no gross musculoskeletal defects noted, no edema  SKIN: no suspicious lesions or rashes    Results for orders placed or performed in visit on 02/29/24 (from the past 24 hour(s))   Streptococcus A Rapid Screen w/Reflex to PCR - Clinic Collect    Specimen: Throat; Swab   Result Value  Ref Range    Group A Strep antigen Positive (A) Negative

## 2024-03-07 ENCOUNTER — TRANSFERRED RECORDS (OUTPATIENT)
Dept: HEALTH INFORMATION MANAGEMENT | Facility: CLINIC | Age: 39
End: 2024-03-07
Payer: COMMERCIAL

## 2024-04-01 ENCOUNTER — TELEPHONE (OUTPATIENT)
Dept: FAMILY MEDICINE | Facility: CLINIC | Age: 39
End: 2024-04-01
Payer: COMMERCIAL

## 2024-04-01 DIAGNOSIS — Z31.62 VISIT FOR FERTILITY PRESERVATION COUNSELING PRIOR TO CANCER THERAPY: Primary | ICD-10-CM

## 2024-04-01 NOTE — TELEPHONE ENCOUNTER
Pt calling, she stated her  was just diagnosed with Cancer and she was stating they still want to have kids. She is asking if her PCP is able to send a referral to somewhere where she can freeze her eggs etc.     She would like a call back.

## 2024-04-02 NOTE — TELEPHONE ENCOUNTER
Order placed- patient will need to call OB as it is primary care. She may be able to go to other facility, but recommend working with cancer center.

## 2024-04-04 NOTE — TELEPHONE ENCOUNTER
Contacted patient, she does NOT have cancer and this was for her  not her.  Routing to provider to alert and have referral cancelled.

## 2024-04-05 ENCOUNTER — TRANSFERRED RECORDS (OUTPATIENT)
Dept: HEALTH INFORMATION MANAGEMENT | Facility: CLINIC | Age: 39
End: 2024-04-05
Payer: COMMERCIAL

## 2024-04-05 NOTE — TELEPHONE ENCOUNTER
Her partner has cancer, the cancer center still might be able to help her.     JOSE ANGEL LOUIS, DO

## 2024-04-10 NOTE — TELEPHONE ENCOUNTER
Relayed Dr. De Santiago's message. Pt states they already have an appointment with the sperm bank.

## 2024-04-16 DIAGNOSIS — M54.2 NECK PAIN: Primary | ICD-10-CM

## 2024-04-23 ENCOUNTER — TELEPHONE (OUTPATIENT)
Dept: FAMILY MEDICINE | Facility: CLINIC | Age: 39
End: 2024-04-23
Payer: COMMERCIAL

## 2024-04-23 NOTE — TELEPHONE ENCOUNTER
Order/Referral Request    Who is requesting: patient    Orders being requested: PT Back and PT     Reason service is needed/diagnosis: Back issues    When are orders needed by: asap    Has this been discussed with Provider: Yes    Does patient have a preference on a Group/Provider/Facility? [facility] Fax to [phone]      Does patient have an appointment scheduled?: No    Where to send orders: N/A    Could we send this information to you in [employer] or would you prefer to receive a phone call?:   Patient would prefer a phone call   Okay to leave a detailed message?: Yes at Cell number on file:    Telephone Information:   Mobile [phone]

## 2024-05-12 ENCOUNTER — HEALTH MAINTENANCE LETTER (OUTPATIENT)
Age: 39
End: 2024-05-12

## 2024-06-11 ENCOUNTER — TRANSFERRED RECORDS (OUTPATIENT)
Dept: MULTI SPECIALTY CLINIC | Facility: CLINIC | Age: 39
End: 2024-06-11
Payer: COMMERCIAL

## 2024-06-11 LAB
HPV ABSTRACT: NORMAL
PAP-ABSTRACT: NORMAL

## 2024-06-25 ENCOUNTER — OFFICE VISIT (OUTPATIENT)
Dept: FAMILY MEDICINE | Facility: CLINIC | Age: 39
End: 2024-06-25
Payer: COMMERCIAL

## 2024-06-25 VITALS
RESPIRATION RATE: 16 BRPM | WEIGHT: 184 LBS | BODY MASS INDEX: 29.57 KG/M2 | SYSTOLIC BLOOD PRESSURE: 122 MMHG | TEMPERATURE: 98 F | HEIGHT: 66 IN | DIASTOLIC BLOOD PRESSURE: 76 MMHG | HEART RATE: 75 BPM | OXYGEN SATURATION: 98 %

## 2024-06-25 DIAGNOSIS — D73.4 SPLENIC CYST: ICD-10-CM

## 2024-06-25 DIAGNOSIS — F43.22 ADJUSTMENT DISORDER WITH ANXIOUS MOOD: ICD-10-CM

## 2024-06-25 DIAGNOSIS — Z86.19 HISTORY OF HELICOBACTER PYLORI INFECTION: ICD-10-CM

## 2024-06-25 DIAGNOSIS — L92.0 GRANULOMA ANNULARE: ICD-10-CM

## 2024-06-25 DIAGNOSIS — R07.89 OTHER CHEST PAIN: ICD-10-CM

## 2024-06-25 DIAGNOSIS — Z13.220 SCREENING FOR HYPERLIPIDEMIA: ICD-10-CM

## 2024-06-25 DIAGNOSIS — Z00.00 ROUTINE GENERAL MEDICAL EXAMINATION AT A HEALTH CARE FACILITY: Primary | ICD-10-CM

## 2024-06-25 LAB
CHOLEST SERPL-MCNC: 180 MG/DL
ERYTHROCYTE [DISTWIDTH] IN BLOOD BY AUTOMATED COUNT: 13.1 % (ref 10–15)
FASTING STATUS PATIENT QL REPORTED: ABNORMAL
HCT VFR BLD AUTO: 37.6 % (ref 35–47)
HDLC SERPL-MCNC: 55 MG/DL
HGB BLD-MCNC: 12.2 G/DL (ref 11.7–15.7)
LDLC SERPL CALC-MCNC: 106 MG/DL
MCH RBC QN AUTO: 28.6 PG (ref 26.5–33)
MCHC RBC AUTO-ENTMCNC: 32.4 G/DL (ref 31.5–36.5)
MCV RBC AUTO: 88 FL (ref 78–100)
NONHDLC SERPL-MCNC: 125 MG/DL
PLATELET # BLD AUTO: 185 10E3/UL (ref 150–450)
RBC # BLD AUTO: 4.26 10E6/UL (ref 3.8–5.2)
TRIGL SERPL-MCNC: 94 MG/DL
WBC # BLD AUTO: 6.4 10E3/UL (ref 4–11)

## 2024-06-25 PROCEDURE — 85027 COMPLETE CBC AUTOMATED: CPT | Performed by: FAMILY MEDICINE

## 2024-06-25 PROCEDURE — 99395 PREV VISIT EST AGE 18-39: CPT | Mod: 25 | Performed by: FAMILY MEDICINE

## 2024-06-25 PROCEDURE — 36415 COLL VENOUS BLD VENIPUNCTURE: CPT | Performed by: FAMILY MEDICINE

## 2024-06-25 PROCEDURE — 99213 OFFICE O/P EST LOW 20 MIN: CPT | Mod: 25 | Performed by: FAMILY MEDICINE

## 2024-06-25 PROCEDURE — 80061 LIPID PANEL: CPT | Performed by: FAMILY MEDICINE

## 2024-06-25 SDOH — HEALTH STABILITY: PHYSICAL HEALTH: ON AVERAGE, HOW MANY DAYS PER WEEK DO YOU ENGAGE IN MODERATE TO STRENUOUS EXERCISE (LIKE A BRISK WALK)?: 7 DAYS

## 2024-06-25 ASSESSMENT — SOCIAL DETERMINANTS OF HEALTH (SDOH): HOW OFTEN DO YOU GET TOGETHER WITH FRIENDS OR RELATIVES?: TWICE A WEEK

## 2024-06-25 NOTE — PATIENT INSTRUCTIONS
"Patient Education   Preventive Care Advice   This is general advice we often give to help people stay healthy. Your care team may have specific advice just for you. Please talk to your care team about your own preventive care needs.  Lifestyle  Exercise at least 150 minutes each week (30 minutes a day, 5 days a week).  Do muscle strengthening activities 2 days a week. These help control your weight and prevent disease.  No smoking.  Wear sunscreen to prevent skin cancer.  Have your home tested for radon every 2 to 5 years. Radon is a colorless, odorless gas that can harm your lungs. To learn more, go to www.health.[facility].[place].us and search for \"Radon in Homes.\"  Keep guns unloaded and locked up in a safe place like a safe or gun vault, or, use a gun lock and hide the keys. Always lock away bullets separately. To learn more, visit [employer].mn.gov and search for \"safe gun storage.\"  Nutrition  Eat 5 or more servings of fruits and vegetables each day.  Try wheat bread, brown rice and whole grain pasta (instead of white bread, rice, and pasta).  Get enough calcium and vitamin D. Check the label on foods and aim for 100% of the RDA (recommended daily allowance).  Regular exams  Have a dental exam and cleaning every 6 months.  See your health care team every year to talk about:  Any changes in your health.  Any medicines your care team has prescribed.  Preventive care, family planning, and ways to prevent chronic diseases.  Shots (vaccines)   HPV shots (up to age 26), if you've never had them before.  Hepatitis B shots (up to age 59), if you've never had them before.  COVID-19 shot: Get this shot when it's due.  Flu shot: Get a flu shot every year.  Tetanus shot: Get a tetanus shot every 10 years.  Pneumococcal, hepatitis A, and RSV shots: Ask your care team if you need these based on your risk.  Shingles shot (for age 50 and up).  General health tests  Diabetes screening:  Starting at age 35, Get screened for diabetes at least " every 3 years.  If you are younger than age 35, ask your care team if you should be screened for diabetes.  Cholesterol test: At age 39, start having a cholesterol test every 5 years, or more often if advised.  Bone density scan (DEXA): At age 50, ask your care team if you should have this scan for osteoporosis (brittle bones).  Hepatitis C: Get tested at least once in your life.  Abdominal aortic aneurysm screening: Talk to your doctor about having this screening if you:  Have ever smoked; and  Are biologically male; and  Are between the ages of 65 and 75.  STIs (sexually transmitted infections)  Before age 24: Ask your care team if you should be screened for STIs.  After age 24: Get screened for STIs if you're at risk. You are at risk for STIs (including HIV) if:  You are sexually active with more than one person.  You don't use condoms every time.  You or a partner was diagnosed with a sexually transmitted infection.  If you are at risk for HIV, ask about PrEP medicine to prevent HIV.  Get tested for HIV at least once in your life, whether you are at risk for HIV or not.  Cancer screening tests  Cervical cancer screening: If you have a cervix, begin getting regular cervical cancer screening tests at age 21. Most people who have regular screenings with normal results can stop after age 65. Talk about this with your provider.  Breast cancer scan (mammogram): If you've ever had breasts, begin having regular mammograms starting at age 40. This is a scan to check for breast cancer.  Colon cancer screening: It is important to start screening for colon cancer at age 45.  Have a colonoscopy test every 10 years (or more often if you're at risk) Or, ask your provider about stool tests like a FIT test every year or Cologuard test every 3 years.  To learn more about your testing options, visit: www.[employer]/221140.pdf.  For help making a decision, visit: jose/ym12510.  Prostate cancer screening test: If you have a  prostate and are age 55 to 69, ask your provider if you would benefit from a yearly prostate cancer screening test.  Lung cancer screening: If you are a current or former smoker age 50 to 80, ask your care team if ongoing lung cancer screenings are right for you.  For informational purposes only. Not to replace the advice of your health care provider. Copyright   2023 [facility] [place]. All rights reserved. Clinically reviewed by the [facility] Transitions Program. [employer] [id number] - REV 04/24.

## 2024-06-25 NOTE — PROGRESS NOTES
"Preventive Care Visit  [facility]  JOSE ANGEL SUSHIL DO MISSY, Family Medicine  Jun 25, 2024      Assessment & Plan     Routine general medical examination at a health care facility  Patient is here for well visit with multiple concerns. Had pap done very recently at outside facility. Declines vaccinations as it is against personal beliefs.     Other chest pain  Acute, seems to be exacerbated with her anxiety regarding 's cancer diagnosis. No SOB or difficulty breathing, no appreciable mass, will do imaging today to rule out other causes.   - XR Chest 2 Views    Adjustment disorder with anxious mood  New, patient with racing thoughts and fearful of diseases/symptoms given 's diagnosis. Discussed progressive muscle relaxation techniques.     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 Continue observation, can consider tacrolimus given the area of distribution.     Screening for hyperlipidemia  - Lipid panel reflex to direct LDL Fasting  - Lipid panel reflex to direct LDL Fasting    History of Helicobacter pylori infection  Chronic with new epigastric pain. Will assess for H pylori.   - Helicobacter pylori Antigen Stool  - CBC with platelets  - Helicobacter pylori Antigen Stool  - CBC with platelets    Splenic cyst  Chronic, incidental finding on prior exam. Recommend surveillance ultrasound.   - US Abdomen Limited      Patient has been advised of split billing requirements and indicates understanding: Yes        BMI  Estimated body mass index is 29.7 kg/m  as calculated from the following:    Height as of this encounter: 1.676 m (5' 6\").    Weight as of this encounter: 83.5 kg (184 lb).   Weight management plan: Discussed healthy diet and exercise guidelines    Counseling  Appropriate preventive services were discussed with this patient, including applicable screening as appropriate for fall prevention, nutrition, physical activity, Tobacco-use cessation, weight loss and " "cognition.  Checklist reviewing preventive services available has been given to the patient.  Reviewed patient's diet, addressing concerns and/or questions.   She is at risk for psychosocial distress and has been provided with information to reduce risk.       See Patient Instructions    Lenard Bernstein is a 39 year old, presenting for the following:  Physical        6/25/2024     9:22 AM   Additional Questions   Roomed by Rufino Sethi   Accompanied by N/A        Via the Health Maintenance questionnaire, the patient has reported the following services have been completed -Cervical Cancer Screening: [facility] 2024-06-11, this information has been sent to the abstraction team.  Health Care Directive  Patient does not have a Health Care Directive or Living Will: Discussed advance care planning with patient; information given to patient to review.    HPI    Left chest pain   -Ongoing for the last several months since 's cancer diagnosis. She is able to ambulate, exercise and do things around the house. She does drink valerian roots drops or pills to treat symptoms.   -Sometimes radiates down the left arm  -It is on and off   -No lumps or bumps  -No breast dimpling    Right sided breast target rash  Singular plaque  Noticed two months ago  Was treated for a similar episode with Dermatology in 2017 but it self resolved    History of H pylori  [place]  -When she wakes up she has \"empty stomach\" feeling, she gets very bloated and feels very gassy  -No pain with eating  -Having weight gain but no weight loss  -No swallowing difficulty         6/25/2024   General Health   How would you rate your overall physical health? Good   Feel stress (tense, anxious, or unable to sleep) Only a little      (!) STRESS CONCERN      6/25/2024   Nutrition   Three or more servings of calcium each day? Yes   Diet: Low salt   How many servings of fruit and vegetables per day? (!) 2-3   How many sweetened " beverages each day? 0-1            6/25/2024   Exercise   Days per week of moderate/strenous exercise 7 days            6/25/2024   Social Factors   Frequency of gathering with friends or relatives Twice a week   Worry food won't last until get money to buy more No   Food not last or not have enough money for food? No   Do you have housing? (Housing is defined as stable permanent housing and does not include staying ouside in a car, in a tent, in an abandoned building, in an overnight shelter, or couch-surfing.) Yes   Are you worried about losing your housing? No   Lack of transportation? No   Unable to get utilities (heat,electricity)? No            6/25/2024   Dental   Dentist two times every year? Yes            6/25/2024   TB Screening   Were you born outside of the [place]? Yes            Today's PHQ-2 Score:       6/25/2024     9:14 AM   PHQ-2 ( 1999 [employer])   Q1: Little interest or pleasure in doing things 0   Q2: Feeling down, depressed or hopeless 0   PHQ-2 Score 0   Q1: Little interest or pleasure in doing things Not at all   Q2: Feeling down, depressed or hopeless Not at all   PHQ-2 Score 0           6/25/2024   Substance Use   Alcohol more than 3/day or more than 7/wk Not Applicable   Do you use any other substances recreationally? No        Social History     Tobacco Use    Smoking status: Never    Smokeless tobacco: Never   Substance Use Topics    Alcohol use: No    Drug use: No        Mammogram Screening - Mammogram every 1-2 years updated in Health Maintenance based on mutual decision making        6/25/2024   STI Screening   New sexual partner(s) since last STI/HIV test? No        History of abnormal Pap smear: No - age 30- 64 PAP with HPV every 5 years recommended           6/25/2024   Contraception/Family Planning   Questions about contraception or family planning No      Reviewed and updated as needed this visit by Provider   Tobacco  Allergies  Meds  Problems  Med Hx  Surg Hx  Fam Hx    "  Sexual Activity          Past Medical History:   Diagnosis Date    Uterine prolapse      Past Surgical History:   Procedure Laterality Date     SECTION N/A 2021    Procedure: PRIMARY  SECTION;  Surgeon: Gaviota Lepe MD;  [facility];  Service: Obstetrics     Review of Systems  Constitutional, neuro, ENT, endocrine, pulmonary, cardiac, gastrointestinal, genitourinary, musculoskeletal, integument and psychiatric systems are negative, except as otherwise noted.     Objective    Exam  /76 (BP Location: Right arm, Patient Position: Sitting, Cuff Size: Adult Regular)   Pulse 75   Temp 98  F (36.7  C) (Oral)   Resp 16   Ht 1.676 m (5' 6\")   Wt 83.5 kg (184 lb)   SpO2 98%   BMI 29.70 kg/m     Estimated body mass index is 29.7 kg/m  as calculated from the following:    Height as of this encounter: 1.676 m (5' 6\").    Weight as of this encounter: 83.5 kg (184 lb).    Physical Exam  GENERAL: alert and no distress  EYES: Eyes grossly normal to inspection, PERRL and conjunctivae and sclerae normal  HENT: ear canals and TM's normal, nose and mouth without ulcers or lesions  NECK: no adenopathy, no asymmetry, masses, or scars  RESP: lungs clear to auscultation - no rales, rhonchi or wheezes  CV: regular rate and rhythm, normal S1 S2, no S3 or S4, no murmur, click or rub, no peripheral edema  ABDOMEN: soft, nontender, no hepatosplenomegaly, no masses and bowel sounds normal  MS: no gross musculoskeletal defects noted, no edema  SKIN: single erythematous ovoid plaque        Signed Electronically by: JOSE ANGEL LOUIS DO    "

## 2024-06-28 PROCEDURE — 87338 HPYLORI STOOL AG IA: CPT | Performed by: FAMILY MEDICINE

## 2024-07-01 LAB — H PYLORI AG STL QL IA: NEGATIVE

## 2024-07-01 NOTE — RESULT ENCOUNTER NOTE
Hello -    Here are my comments about your recent results:  -Normal red blood cell (hgb) levels, normal white blood cell count and normal platelet levels.  -Cholesterol levels (LDL,HDL, Triglycerides) are normal. ADVISE: rechecking in 5 years.  For additional lab test information, labtestsonline.org is an excellent reference..    Please let us know if you have any questions or concerns.     Regards,  JOSE ANGEL LOUIS, DO

## 2024-10-08 ENCOUNTER — OFFICE VISIT (OUTPATIENT)
Dept: FAMILY MEDICINE | Facility: CLINIC | Age: 39
End: 2024-10-08
Payer: COMMERCIAL

## 2024-10-08 VITALS
SYSTOLIC BLOOD PRESSURE: 120 MMHG | HEART RATE: 70 BPM | TEMPERATURE: 98.5 F | DIASTOLIC BLOOD PRESSURE: 72 MMHG | OXYGEN SATURATION: 98 % | RESPIRATION RATE: 20 BRPM

## 2024-10-08 DIAGNOSIS — H92.02 LEFT EAR PAIN: ICD-10-CM

## 2024-10-08 DIAGNOSIS — R07.0 THROAT PAIN: Primary | ICD-10-CM

## 2024-10-08 LAB
DEPRECATED S PYO AG THROAT QL EIA: NEGATIVE
GROUP A STREP BY PCR: NOT DETECTED

## 2024-10-08 PROCEDURE — 99213 OFFICE O/P EST LOW 20 MIN: CPT | Performed by: PHYSICIAN ASSISTANT

## 2024-10-08 PROCEDURE — 87651 STREP A DNA AMP PROBE: CPT | Performed by: PHYSICIAN ASSISTANT

## 2024-10-08 RX ORDER — FLUTICASONE PROPIONATE 50 MCG
1 SPRAY, SUSPENSION (ML) NASAL DAILY
Qty: 9.9 ML | Refills: 0 | Status: SHIPPED | OUTPATIENT
Start: 2024-10-08

## 2024-10-08 RX ORDER — CHOLECALCIFEROL (VITAMIN D3) 10(400)/ML
DROPS ORAL
COMMUNITY

## 2024-10-08 NOTE — PROGRESS NOTES
"Patient presents with:  Ear Problem: Lt ear pain x ongoing 3 months. Pt states sx worsen and hearing \"vibrations\". Dizziness, no headaches or fever. No change of hearing.      Clinical Decision Making:  Patient experiencing intermittent left ear discomfort.  Ear exam is normal. Possible ETD; patient started on Flonase. ENT referral made per patient request. Throat and lung exam normal. RST neg. COVID test offered, but declined.       ICD-10-CM    1. Throat pain  R07.0 Streptococcus A Rapid Screen w/Reflex to PCR     Group A Streptococcus PCR Throat Swab      2. Left ear pain  H92.02 Adult ENT  Referral     fluticasone (FLONASE) 50 MCG/ACT nasal spray          Patient Instructions   1) Increase fluids and rest  2) Try Flonase nasal spray. You can additionally use saline nasal spray.   3) Take Tylenol as needed for pain relief.   4) Follow up with ENT.   5) I will call with your strep test results.       HPI:  Day Quevedo is a 39 year old female who presents today with concerns of left ear discomfort that is been ongoing for the past 3 months.  Patient reports a sense of vibration in the left ear.  Patient is requesting ENT referral because she reports a history of tonsillitis and needs to have her tonsils out along with these ear symptoms.  At times she has scalp pain all around the left ear.  She denies any ear discharge, fevers, or external ear issues.  At times she feels itchy in the ear canal very deeply.  She also at moments has decrease in her hearing but then it resolves and comes back.  Lately she has been having sore throat and cough.  She has a child in  and strep is going around.  She requests strep test.  She declines COVID test.    History obtained from the patient.    Problem List:  2023-04: Dysmenorrhea  2023-04: Anemia affecting third pregnancy  2023-04: Constipation, unspecified constipation type  2023-04: Cystocele, midline  2023-04: Low back pain  2023-04: Uterovaginal prolapse, " incomplete  2023: Vaginal atrophy  2021: Dizziness  2021: Helicobacter pylori infection  2021: Orthostatic hypotension  2021: H/O  section  2020: Granuloma annulare  2018: Pregnant  2018: Shortened KS interval  2015: Varicose veins of both lower extremities with pain  2015-10: Vertigo, benign paroxysmal  2015: Herniated lumbar intervertebral disc  2015: Sacroiliac dysfunction  2014: Pap smear abnormality of cervix with LGSIL      Past Medical History:   Diagnosis Date    Uterine prolapse        Social History     Tobacco Use    Smoking status: Never     Passive exposure: Never    Smokeless tobacco: Never   Substance Use Topics    Alcohol use: No         Review of Systems    Vitals:    10/08/24 1035   BP: 120/72   Pulse: 70   Resp: 20   Temp: 98.5  F (36.9  C)   TempSrc: Tympanic   SpO2: 98%       Physical Exam  Vitals and nursing note reviewed.   Constitutional:       General: She is not in acute distress.     Appearance: She is not toxic-appearing or diaphoretic.   HENT:      Head: Normocephalic and atraumatic.      Right Ear: Tympanic membrane, ear canal and external ear normal.      Left Ear: Tympanic membrane, ear canal and external ear normal.      Mouth/Throat:      Mouth: Mucous membranes are moist.      Pharynx: No oropharyngeal exudate or posterior oropharyngeal erythema.   Eyes:      Conjunctiva/sclera: Conjunctivae normal.   Cardiovascular:      Rate and Rhythm: Normal rate and regular rhythm.      Heart sounds: No murmur heard.  Pulmonary:      Effort: Pulmonary effort is normal. No respiratory distress.      Breath sounds: Normal breath sounds. No stridor. No wheezing or rales.   Lymphadenopathy:      Cervical: No cervical adenopathy.   Neurological:      Mental Status: She is alert.   Psychiatric:         Mood and Affect: Mood normal.         Behavior: Behavior normal.         Thought Content: Thought content normal.         Judgment: Judgment normal.          Results:  Results for orders placed or performed in visit on 10/08/24   Streptococcus A Rapid Screen w/Reflex to PCR     Status: Normal    Specimen: Throat; Swab   Result Value Ref Range    Group A Strep antigen Negative Negative         At the end of the encounter, I discussed results, diagnosis, medications. Discussed red flags for immediate return to clinic/ER, as well as indications for follow up if no improvement. Patient understood and agreed to plan. Patient was stable for discharge.   Oral Minoxidil Pregnancy And Lactation Text: This medication should only be used when clearly needed if you are pregnant, attempting to become pregnant or breast feeding.

## 2024-10-31 ENCOUNTER — TRANSFERRED RECORDS (OUTPATIENT)
Dept: HEALTH INFORMATION MANAGEMENT | Facility: CLINIC | Age: 39
End: 2024-10-31
Payer: COMMERCIAL

## 2025-01-30 ENCOUNTER — OFFICE VISIT (OUTPATIENT)
Dept: FAMILY MEDICINE | Facility: CLINIC | Age: 40
End: 2025-01-30
Payer: COMMERCIAL

## 2025-01-30 VITALS
RESPIRATION RATE: 16 BRPM | OXYGEN SATURATION: 96 % | TEMPERATURE: 99.2 F | WEIGHT: 186.5 LBS | HEART RATE: 90 BPM | BODY MASS INDEX: 29.97 KG/M2 | HEIGHT: 66 IN | SYSTOLIC BLOOD PRESSURE: 125 MMHG | DIASTOLIC BLOOD PRESSURE: 78 MMHG

## 2025-01-30 DIAGNOSIS — M79.622 LEFT AXILLARY PAIN: Primary | ICD-10-CM

## 2025-01-30 LAB
ERYTHROCYTE [DISTWIDTH] IN BLOOD BY AUTOMATED COUNT: 12.9 % (ref 10–15)
HCT VFR BLD AUTO: 35.3 % (ref 35–47)
HGB BLD-MCNC: 11.3 G/DL (ref 11.7–15.7)
MCH RBC QN AUTO: 28 PG (ref 26.5–33)
MCHC RBC AUTO-ENTMCNC: 32 G/DL (ref 31.5–36.5)
MCV RBC AUTO: 87 FL (ref 78–100)
PLATELET # BLD AUTO: 202 10E3/UL (ref 150–450)
RBC # BLD AUTO: 4.04 10E6/UL (ref 3.8–5.2)
WBC # BLD AUTO: 7.2 10E3/UL (ref 4–11)

## 2025-01-30 ASSESSMENT — PAIN SCALES - GENERAL: PAINLEVEL_OUTOF10: NO PAIN (0)

## 2025-01-30 NOTE — PROGRESS NOTES
"  Assessment & Plan     Left axillary pain  on-and-off dull, deep ache in the left armpit for the past two months, improving with exercise.  There are no lumps, and a mammogram two months ago was normal. She denies weight loss, and the physical exam shows no apparent abnormalities.     Plan:   - US Axillary Left; Future  - CBC with platelets          BMI  Estimated body mass index is 30.1 kg/m  as calculated from the following:    Height as of this encounter: 1.676 m (5' 6\").    Weight as of this encounter: 84.6 kg (186 lb 8 oz).         The longitudinal plan of care for the diagnosis(es)/condition(s) as documented were addressed during this visit. Due to the added complexity in care, I will continue to support Day in the subsequent management and with ongoing continuity of care.    Lenard Bernstein is a 39 year old, presenting for  underarm pain and a request for blood work. She reports experiencing on-and-off pain in the left armpit for the past two months. The pain is described as a dull, deep ache that improves with exercise and has no relation to diet or eating. There are no lumps present, and a mammogram performed two months ago was normal.  She denies weight loss.  She notes to experiencing occasional lightheadedness.           1/30/2025    11:50 AM   Additional Questions   Roomed by MARILU Nunez   Accompanied by FIDE     History of Present Illness       Reason for visit:  Underarm pain  Symptom onset:  More than a month  Symptoms include:  On the left armpit. on and off. changed from wired bra to sports bra and it's worse. light headedness.  Symptom intensity:  Moderate  Symptom progression:  Staying the same She is missing 3 dose(s) of medications per week.                     Objective    /78 (BP Location: Right arm, Patient Position: Sitting, Cuff Size: Adult Regular)   Pulse 90   Temp 99.2  F (37.3  C) (Temporal)   Resp 16   Ht 1.676 m (5' 6\")   Wt 84.6 kg (186 lb 8 oz)   LMP 01/23/2025 " (Exact Date)   SpO2 96%   BMI 30.10 kg/m    Body mass index is 30.1 kg/m .  Physical Exam   Left axillae: normal, no palpable mass, or adenopathy, no rash or lesions, nontender to palpation            Signed Electronically by: Beka Omer MD

## 2025-05-26 ENCOUNTER — PATIENT OUTREACH (OUTPATIENT)
Dept: CARE COORDINATION | Facility: CLINIC | Age: 40
End: 2025-05-26
Payer: COMMERCIAL

## 2025-06-09 ENCOUNTER — PATIENT OUTREACH (OUTPATIENT)
Dept: CARE COORDINATION | Facility: CLINIC | Age: 40
End: 2025-06-09
Payer: COMMERCIAL

## 2025-08-10 ENCOUNTER — HEALTH MAINTENANCE LETTER (OUTPATIENT)
Age: 40
End: 2025-08-10

## 2025-08-11 ENCOUNTER — TELEPHONE (OUTPATIENT)
Dept: FAMILY MEDICINE | Facility: CLINIC | Age: 40
End: 2025-08-11
Payer: COMMERCIAL

## 2025-08-11 DIAGNOSIS — R10.13 EPIGASTRIC PAIN: ICD-10-CM

## 2025-08-11 DIAGNOSIS — Z86.19 HISTORY OF HELICOBACTER PYLORI INFECTION: Primary | ICD-10-CM

## 2025-08-15 ENCOUNTER — TELEPHONE (OUTPATIENT)
Dept: GASTROENTEROLOGY | Facility: CLINIC | Age: 40
End: 2025-08-15
Payer: COMMERCIAL

## 2025-08-19 ENCOUNTER — HOSPITAL ENCOUNTER (OUTPATIENT)
Facility: CLINIC | Age: 40
Discharge: HOME | End: 2025-08-19
Attending: INTERNAL MEDICINE | Admitting: INTERNAL MEDICINE
Payer: COMMERCIAL

## 2025-08-19 VITALS
OXYGEN SATURATION: 96 % | BODY MASS INDEX: 28.28 KG/M2 | SYSTOLIC BLOOD PRESSURE: 94 MMHG | WEIGHT: 169.75 LBS | HEART RATE: 63 BPM | HEIGHT: 65 IN | TEMPERATURE: 97.5 F | DIASTOLIC BLOOD PRESSURE: 67 MMHG | RESPIRATION RATE: 12 BRPM

## 2025-08-19 LAB — UPPER GI ENDOSCOPY: NORMAL

## 2025-08-19 PROCEDURE — 250N000011 HC RX IP 250 OP 636: Performed by: INTERNAL MEDICINE

## 2025-08-19 PROCEDURE — 88305 TISSUE EXAM BY PATHOLOGIST: CPT | Mod: 26

## 2025-08-19 PROCEDURE — 250N000009 HC RX 250: Performed by: INTERNAL MEDICINE

## 2025-08-19 PROCEDURE — 999N000099 HC STATISTIC MODERATE SEDATION < 10 MIN: Performed by: INTERNAL MEDICINE

## 2025-08-19 PROCEDURE — 88342 IMHCHEM/IMCYTCHM 1ST ANTB: CPT | Mod: TC | Performed by: INTERNAL MEDICINE

## 2025-08-19 PROCEDURE — 88342 IMHCHEM/IMCYTCHM 1ST ANTB: CPT | Mod: 26

## 2025-08-19 PROCEDURE — 43239 EGD BIOPSY SINGLE/MULTIPLE: CPT | Performed by: INTERNAL MEDICINE

## 2025-08-19 RX ORDER — NALOXONE HYDROCHLORIDE 0.4 MG/ML
0.2 INJECTION, SOLUTION INTRAMUSCULAR; INTRAVENOUS; SUBCUTANEOUS
Status: DISCONTINUED | OUTPATIENT
Start: 2025-08-19 | End: 2025-08-19 | Stop reason: HOSPADM

## 2025-08-19 RX ORDER — FENTANYL CITRATE 50 UG/ML
25-100 INJECTION, SOLUTION INTRAMUSCULAR; INTRAVENOUS EVERY 5 MIN PRN
Refills: 0 | Status: DISCONTINUED | OUTPATIENT
Start: 2025-08-19 | End: 2025-08-19 | Stop reason: HOSPADM

## 2025-08-19 RX ORDER — NALOXONE HYDROCHLORIDE 0.4 MG/ML
0.4 INJECTION, SOLUTION INTRAMUSCULAR; INTRAVENOUS; SUBCUTANEOUS
Status: DISCONTINUED | OUTPATIENT
Start: 2025-08-19 | End: 2025-08-19 | Stop reason: HOSPADM

## 2025-08-19 RX ORDER — ONDANSETRON 4 MG/1
4 TABLET, ORALLY DISINTEGRATING ORAL EVERY 6 HOURS PRN
Status: DISCONTINUED | OUTPATIENT
Start: 2025-08-19 | End: 2025-08-19 | Stop reason: HOSPADM

## 2025-08-19 RX ORDER — FLUMAZENIL 0.1 MG/ML
0.2 INJECTION, SOLUTION INTRAVENOUS
Status: DISCONTINUED | OUTPATIENT
Start: 2025-08-19 | End: 2025-08-19 | Stop reason: HOSPADM

## 2025-08-19 RX ORDER — SIMETHICONE 40MG/0.6ML
133 SUSPENSION, DROPS(FINAL DOSAGE FORM)(ML) ORAL
Status: DISCONTINUED | OUTPATIENT
Start: 2025-08-19 | End: 2025-08-19 | Stop reason: HOSPADM

## 2025-08-19 RX ORDER — LIDOCAINE 40 MG/G
CREAM TOPICAL
Status: DISCONTINUED | OUTPATIENT
Start: 2025-08-19 | End: 2025-08-19 | Stop reason: HOSPADM

## 2025-08-19 RX ORDER — PROCHLORPERAZINE MALEATE 10 MG
10 TABLET ORAL EVERY 6 HOURS PRN
Status: DISCONTINUED | OUTPATIENT
Start: 2025-08-19 | End: 2025-08-19 | Stop reason: HOSPADM

## 2025-08-19 RX ORDER — ONDANSETRON 2 MG/ML
4 INJECTION INTRAMUSCULAR; INTRAVENOUS EVERY 6 HOURS PRN
Status: DISCONTINUED | OUTPATIENT
Start: 2025-08-19 | End: 2025-08-19 | Stop reason: HOSPADM

## 2025-08-19 RX ORDER — EPINEPHRINE 1 MG/ML
0.1 INJECTION, SOLUTION, CONCENTRATE INTRAVENOUS
Status: DISCONTINUED | OUTPATIENT
Start: 2025-08-19 | End: 2025-08-19 | Stop reason: HOSPADM

## 2025-08-19 RX ORDER — ONDANSETRON 2 MG/ML
4 INJECTION INTRAMUSCULAR; INTRAVENOUS
Status: COMPLETED | OUTPATIENT
Start: 2025-08-19 | End: 2025-08-19

## 2025-08-19 RX ORDER — DIPHENHYDRAMINE HYDROCHLORIDE 50 MG/ML
25-50 INJECTION, SOLUTION INTRAMUSCULAR; INTRAVENOUS
Status: DISCONTINUED | OUTPATIENT
Start: 2025-08-19 | End: 2025-08-19 | Stop reason: HOSPADM

## 2025-08-19 RX ORDER — ATROPINE SULFATE 0.1 MG/ML
1 INJECTION INTRAVENOUS
Status: DISCONTINUED | OUTPATIENT
Start: 2025-08-19 | End: 2025-08-19 | Stop reason: HOSPADM

## 2025-08-19 RX ADMIN — FENTANYL CITRATE 100 MCG: 50 INJECTION INTRAMUSCULAR; INTRAVENOUS at 12:33

## 2025-08-19 RX ADMIN — MIDAZOLAM 2 MG: 1 INJECTION INTRAMUSCULAR; INTRAVENOUS at 12:33

## 2025-08-19 RX ADMIN — TOPICAL ANESTHETIC 0.5 ML: 200 SPRAY DENTAL; PERIODONTAL at 12:33

## 2025-08-19 RX ADMIN — ONDANSETRON 4 MG: 2 INJECTION, SOLUTION INTRAMUSCULAR; INTRAVENOUS at 12:35

## 2025-08-19 ASSESSMENT — ACTIVITIES OF DAILY LIVING (ADL)
ADLS_ACUITY_SCORE: 41
ADLS_ACUITY_SCORE: 41

## 2025-08-21 LAB
PATH REPORT.COMMENTS IMP SPEC: NORMAL
PATH REPORT.FINAL DX SPEC: NORMAL
PATH REPORT.GROSS SPEC: NORMAL
PATH REPORT.MICROSCOPIC SPEC OTHER STN: NORMAL
PATH REPORT.RELEVANT HX SPEC: NORMAL
PHOTO IMAGE: NORMAL

## 2025-08-24 LAB
PATH REPORT.ADDENDUM SPEC: NORMAL
PATH REPORT.COMMENTS IMP SPEC: NORMAL
PATH REPORT.FINAL DX SPEC: NORMAL
PATH REPORT.GROSS SPEC: NORMAL
PATH REPORT.MICROSCOPIC SPEC OTHER STN: NORMAL
PATH REPORT.RELEVANT HX SPEC: NORMAL
PHOTO IMAGE: NORMAL

## (undated) DEVICE — KIT ENDO TURNOVER/PROCEDURE W/CLEAN A SCOPE LINERS 103888

## (undated) DEVICE — ENDO FORCEP ENDOJAW BIOPSY 2.8MMX230CM FB-220U

## (undated) DEVICE — ENDO BITE BLOCK ADULT OLYMPUS LATEX FREE MAJ-1632

## (undated) RX ORDER — FENTANYL CITRATE 50 UG/ML
INJECTION, SOLUTION INTRAMUSCULAR; INTRAVENOUS
Status: DISPENSED
Start: 2025-08-19

## (undated) RX ORDER — ONDANSETRON 2 MG/ML
INJECTION INTRAMUSCULAR; INTRAVENOUS
Status: DISPENSED
Start: 2025-08-19